# Patient Record
Sex: MALE | Race: WHITE | NOT HISPANIC OR LATINO | Employment: OTHER | URBAN - METROPOLITAN AREA
[De-identification: names, ages, dates, MRNs, and addresses within clinical notes are randomized per-mention and may not be internally consistent; named-entity substitution may affect disease eponyms.]

---

## 2017-01-25 RX ORDER — ESCITALOPRAM OXALATE 20 MG/1
TABLET ORAL
Qty: 30 TABLET | Refills: 11 | Status: SHIPPED | OUTPATIENT
Start: 2017-01-25 | End: 2018-03-20 | Stop reason: SDUPTHER

## 2017-05-12 ENCOUNTER — DOCUMENTATION ONLY (OUTPATIENT)
Dept: FAMILY MEDICINE | Facility: CLINIC | Age: 67
End: 2017-05-12

## 2017-05-12 NOTE — PROGRESS NOTES
Pre-Visit Chart Review  For Appointment Scheduled on 05/17/2017    Health Maintenance Due   Topic Date Due    Abdominal Aortic Aneurysm Screening  12/06/2013    Lipid Panel  02/09/2014

## 2017-05-17 ENCOUNTER — OFFICE VISIT (OUTPATIENT)
Dept: FAMILY MEDICINE | Facility: CLINIC | Age: 67
End: 2017-05-17
Payer: MEDICARE

## 2017-05-17 ENCOUNTER — LAB VISIT (OUTPATIENT)
Dept: LAB | Facility: HOSPITAL | Age: 67
End: 2017-05-17
Attending: FAMILY MEDICINE
Payer: MEDICARE

## 2017-05-17 VITALS
HEART RATE: 62 BPM | HEIGHT: 71 IN | WEIGHT: 169.75 LBS | TEMPERATURE: 98 F | SYSTOLIC BLOOD PRESSURE: 101 MMHG | BODY MASS INDEX: 23.77 KG/M2 | DIASTOLIC BLOOD PRESSURE: 62 MMHG

## 2017-05-17 DIAGNOSIS — I48.0 PAROXYSMAL ATRIAL FIBRILLATION: ICD-10-CM

## 2017-05-17 DIAGNOSIS — I48.0 PAROXYSMAL ATRIAL FIBRILLATION: Primary | ICD-10-CM

## 2017-05-17 DIAGNOSIS — M70.22 OLECRANON BURSITIS OF LEFT ELBOW: ICD-10-CM

## 2017-05-17 LAB
ANION GAP SERPL CALC-SCNC: 11 MMOL/L
BASOPHILS # BLD AUTO: 0.05 K/UL
BASOPHILS NFR BLD: 0.9 %
BUN SERPL-MCNC: 14 MG/DL
CALCIUM SERPL-MCNC: 9.1 MG/DL
CHLORIDE SERPL-SCNC: 104 MMOL/L
CO2 SERPL-SCNC: 28 MMOL/L
CREAT SERPL-MCNC: 0.9 MG/DL
DIFFERENTIAL METHOD: ABNORMAL
EOSINOPHIL # BLD AUTO: 0.1 K/UL
EOSINOPHIL NFR BLD: 1.7 %
ERYTHROCYTE [DISTWIDTH] IN BLOOD BY AUTOMATED COUNT: 13.8 %
EST. GFR  (AFRICAN AMERICAN): >60 ML/MIN/1.73 M^2
EST. GFR  (NON AFRICAN AMERICAN): >60 ML/MIN/1.73 M^2
GLUCOSE SERPL-MCNC: 69 MG/DL
HCT VFR BLD AUTO: 41.8 %
HGB BLD-MCNC: 13.6 G/DL
LYMPHOCYTES # BLD AUTO: 1.8 K/UL
LYMPHOCYTES NFR BLD: 33.1 %
MAGNESIUM SERPL-MCNC: 2 MG/DL
MCH RBC QN AUTO: 32 PG
MCHC RBC AUTO-ENTMCNC: 32.5 %
MCV RBC AUTO: 98 FL
MONOCYTES # BLD AUTO: 0.4 K/UL
MONOCYTES NFR BLD: 7.5 %
NEUTROPHILS # BLD AUTO: 3 K/UL
NEUTROPHILS NFR BLD: 56.8 %
PLATELET # BLD AUTO: 182 K/UL
PMV BLD AUTO: 9.6 FL
POTASSIUM SERPL-SCNC: 4.5 MMOL/L
RBC # BLD AUTO: 4.25 M/UL
SODIUM SERPL-SCNC: 143 MMOL/L
WBC # BLD AUTO: 5.35 K/UL

## 2017-05-17 PROCEDURE — 85025 COMPLETE CBC W/AUTO DIFF WBC: CPT

## 2017-05-17 PROCEDURE — 83735 ASSAY OF MAGNESIUM: CPT

## 2017-05-17 PROCEDURE — 80048 BASIC METABOLIC PNL TOTAL CA: CPT

## 2017-05-17 PROCEDURE — 99214 OFFICE O/P EST MOD 30 MIN: CPT | Mod: S$PBB,,, | Performed by: FAMILY MEDICINE

## 2017-05-17 PROCEDURE — 36415 COLL VENOUS BLD VENIPUNCTURE: CPT | Mod: PO

## 2017-05-17 PROCEDURE — 99999 PR PBB SHADOW E&M-EST. PATIENT-LVL III: CPT | Mod: PBBFAC,,, | Performed by: FAMILY MEDICINE

## 2017-05-17 RX ORDER — CLONAZEPAM 0.5 MG/1
TABLET ORAL
Refills: 1 | COMMUNITY
Start: 2017-04-18 | End: 2018-07-31

## 2017-05-17 NOTE — PROGRESS NOTES
Subjective:       Patient ID: Nicho Duque is a 66 y.o. male.    Chief Complaint: Annual Exam    HPI Comments: SUBJECTIVE:   Nicho Duque is a 66 y.o. male presenting for his annual checkup.  Current Outpatient Prescriptions:  ANTIOX#12/OM3/DHA/EPA/LUT/ZEAN (MACULAR BENEFITS ORAL), Take by mouth., Disp: , Rfl:   aspirin (ECOTRIN) 81 MG EC tablet, Take 81 mg by mouth once daily., Disp: , Rfl:   atorvastatin (LIPITOR) 40 MG tablet, Take 40 mg by mouth once daily. , Disp: , Rfl: 3  BIOTIN ORAL, Take 5,000 mcg by mouth., Disp: , Rfl:   clonazePAM (KLONOPIN) 0.5 MG tablet, TK 1 T PO BID TITRATE UP TO 2 TS TID UTD, Disp: , Rfl: 1  escitalopram oxalate (LEXAPRO) 20 MG tablet, TAKE 1 TABLET(20 MG) BY MOUTH EVERY DAY, Disp: 30 tablet, Rfl: 11  flecainide (TAMBOCOR) 100 MG Tab, Take 200 mg by mouth every 12 (twelve) hours. , Disp: , Rfl: 3  glucosamine-chondroitin 500-400 mg tablet, Take 1 tablet by mouth 3 (three) times daily., Disp: , Rfl:   multivitamin capsule, Take 1 capsule by mouth once daily. Centrum silver, Disp: , Rfl:   psyllium (METAMUCIL) powder, Take 1 packet by mouth 3 (three) times daily., Disp: , Rfl:   thiamine (VITAMIN B-1) 50 MG tablet, Take 50 mg by mouth once daily., Disp: , Rfl:   VIT A/VIT C/VIT E/ZINC/COPPER (ICAPS AREDS ORAL), Take by mouth., Disp: , Rfl:   rivaroxaban (XARELTO) 10 mg Tab, Take 1 tablet (10 mg total) by mouth 2 (two) times daily with meals. 1 tab bid for ten days then 20 mg daily, Disp: 10 tablet, Rfl: 0  rivaroxaban (XARELTO) 20 mg Tab, Take 1 tablet (20 mg total) by mouth daily with dinner or evening meal., Disp: 30 tablet, Rfl: 4  Patient Active Problem List:     Persistent atrial fibrillation     Hyperlipidemia     Degenerative disc disease     Lumbago     DDD (degenerative disc disease), lumbar     Tremors of nervous system     Depression     Hypotension due to drugs     Iron deficiency anemia due to chronic blood loss     Anemia     Olecranon bursitis of left  "elbow      No current facility-administered medications for this visit.     Allergies: Penicillins     ROS:  Feeling well. No dyspnea or chest pain on exertion. No abdominal pain, change in bowel habits, black or bloody stools. No urinary tract or prostatic symptoms. No neurological complaints.    OBJECTIVE:   The patient appears well, alert, oriented x 3, in no distress.   /62 (BP Location: Right arm, Patient Position: Sitting, BP Method: Automatic)  Pulse 62  Temp 98.3 °F (36.8 °C) (Oral)   Ht 5' 11" (1.803 m)  Wt 77 kg (169 lb 12.1 oz)  BMI 23.68 kg/m2  ENT normal.  Neck supple. No adenopathy or thyromegaly. JONAS. Lungs are clear, good air entry, no wheezes, rhonchi or rales. S1 and S2 normal, no murmurs, regular rate and rhythm. Abdomen is soft without tenderness, guarding, mass or organomegaly.  exam: deferred. Neurological is normal without focal findings.  Musculoskeletal exam: no joint tenderness, deformity or swelling, abnormal exam of left elbow.    ASSESSMENT:   healthy adult male   Paroxysmal atrial fibrillation  rivaroxaban (XARELTO) 20 mg Tab; Take 1 tablet (20 mg total) by mouth daily with dinner or evening meal.  Dispense: 30 tablet; Refill: 4  rivaroxaban (XARELTO) 10 mg Tab; Take 1 tablet (10 mg total) by mouth 2 (two) times daily with meals. 1 tab bid for ten days then 20 mg daily  Dispense: 10 tablet; Refill: 0  Basic metabolic panel; Future  CBC auto differential; Future  Magnesium; Future     Olecranon bursitis of left elbow      PLAN: .p  begin progressive daily aerobic exercise program, attempt to lose weight, decrease or avoid alcohol intake and reduce salt in diet and cooking      Review of Systems    Objective:      Physical Exam    Assessment:       1. Paroxysmal atrial fibrillation    2. Olecranon bursitis of left elbow        Plan:       Paroxysmal atrial fibrillation  -     rivaroxaban (XARELTO) 20 mg Tab; Take 1 tablet (20 mg total) by mouth daily with dinner or " evening meal.  Dispense: 30 tablet; Refill: 4  -     rivaroxaban (XARELTO) 10 mg Tab; Take 1 tablet (10 mg total) by mouth 2 (two) times daily with meals. 1 tab bid for ten days then 20 mg daily  Dispense: 10 tablet; Refill: 0  -     Basic metabolic panel; Future; Expected date: 5/17/17  -     CBC auto differential; Future; Expected date: 5/17/17    Olecranon bursitis of left elbow      ACE bandages. Letter sent to Dr Pepe, Dr Sandoval and Dr Chad Montero.

## 2017-05-17 NOTE — PATIENT INSTRUCTIONS
Living with Atrial Fibrillation: Preventing Stroke  Atrial fibrillation (AFib) is the most common abnormal heart rhythm in the world. The heart has 2 upper chambers called atria and 2 lower chambers called ventricles. AFib causes the atria to quiver (fibrillate) instead of pumping normally. Blood can then pool in the heart instead of moving in and out as usual. This can cause blood clots to form inside the heart. A clot can break free, travel to the brain and cause a stroke. A stroke can cause brain damage very quickly.    Taking medicine to prevent stroke  Your healthcare provider may prescribe a medicine to help prevent blood clots. This type of medicine is called a blood thinner. Blood thinners include:  · Antiplatelet medicines, such as aspirin or clopidrogrel  · Anticoagulation medicines, such as warfarin, dabigatran, rivaroxaban, apixaban, or edoxaban  Risks of blood thinner medicine  Blood thinners increase your risk of bleeding. If you take certain blood thinners, you may need to take extra steps to stay healthy. You may need regular blood tests to check the levels of medicine in your blood. Youll need to be careful not to injure yourself. And you may need to watch your diet for foods that affect blood clotting.  If your blood is too thin, you may have symptoms of excess bleeding, such as:  · Unusual bruising  · Bleeding from the gums  · Blood in the urine or stool  · Black stools  · Vomiting blood  · Nosebleeds  · An unusual or severe headache  Taking the right dose  Youll need to make sure to take the medicine exactly as directed by your healthcare provider. Take it at the same time each day. If you miss a dose, call your provider right away to find out how much to take. Never take a double dose. If you take too much, it can cause too much bleeding. It can cause bleeding you can see, on the outside of your body. And it can cause bleeding on the inside of your body that you may not be aware of.  Getting  your blood tested  Depending on which blood thinner you take, you may need to have your blood tested on a regular schedule. This is to make sure you dont have too much or too little of the medicine in your blood. Too much can cause excess bleeding. Too little may not prevent blood clots from harming you.  You may need to visit a hospital or clinic every week to have your blood tested. Or a nurse may come to your home and test your blood. In some cases, you may be able to test your blood at home with a small machine. Talk with your healthcare provider to find out whats best for you. After the blood test, your healthcare provider may tell you to change your dose of medicine.  Watching your diet  Some foods can affect how certain blood thinners work. In particular, warfarin levels are sensitive to your diet. For example, many foods contain vitamin K. Vitamin K is a substance that helps your blood clot. You dont need to avoid foods that have vitamin K. But you do need to keep the amount of them you eat steady as possible from day to day. Examples of foods high in vitamin K are asparagus, avocado, broccoli, cabbage, kale, spinach, and some other leafy green vegetables. Oils, such as soybean, canola, and olive, are also high in vitamin K.  Other foods and drinks can affect the way blood thinners work in your body. These include:  · Grapefruit and grapefruit juice  · Cranberries and cranberry juice  · Fish oil supplements  · Garlic, fide, licorice, and turmeric  · Herbs used in herbal teas or supplements  · Alcohol  If any of these items are part of your regular diet, continue using them as you normally would. Dont make any major changes in your diet without first talking with your healthcare provider.  You may also need to limit fats in your diet to 2 to 4 tablespoons a day.  Preventing injury  Because blood thinners make you bleed more, youll need to protect yourself from breaks in the skin. Follow these  guidelines:  · Don't go barefoot - always wear shoes.  · Don't trim corns or calluses yourself.  · Consider using an electric razor instead of a manual one.  · Use a soft-bristled toothbrush and waxed dental floss.  Youll also need to avoid any activities that may cause injury. If you fall or are injured, you could be bleeding inside your body and not know it. Make sure to get medical attention right away if you fall, hit your head, or have any other kind of injury.  Other safety tips  While on your medicine, be sure to:  · Tell all of your healthcare providers that you take a blood thinner for AFib. This includes all of your doctors, dental care providers, and your pharmacist.  · Ask your doctor before taking any new medicines, vitamins, or other supplements. Any of these can cause problems when you take a blood thinner.  · Wear a medical alert bracelet or carry an ID card in your wallet if you will be taking blood thinners for months or longer.  · Keep all appointments for your blood tests.  Procedures to prevent stroke  Most blood clots that form in the heart occur in a pouch of the left atrium called the appendage. This pouch can often be large and have multiple lobes which can permit blood pooling and clot formation. Left atrial appendage closure is a nonsurgical procedure in which a self-expanding plug is placed at the opening of the left atrial appendage to close off the appendage from the rest of the heart. Once the plug has fully sealed, no blood can enter or leave the appendage. This reduces blood clot formation and stroke risk. Ask your doctor if you qualify for this type of procedure.  Other ways to help prevent stroke  Your healthcare provider might give you other advice about how to lower your risk for stroke, such as:  · Lowering your cholesterol with lifestyle changes or medicine  · Not smoking  · Getting physical activity  · Losing weight if needed  · Eating a heart-healthy diet  · Not drinking too  much alcohol     When to call your healthcare provider  Call your healthcare provider right away if you have any of these:  · Unusual or severe headache  · Confusion, weakness, or numbness  · Loss of vision  · Difficulty with speech  · Bleeding that wont stop  · Coughing or vomiting blood  · Bright red blood in the stool  · Fall or injury to the head  · Symptoms of atrial fibrillation that are new or getting worse   Date Last Reviewed: 5/1/2016  © 4378-1537 [employer]. [address] [place] [address], [place] [address]. All rights reserved. This information is not intended as a substitute for professional medical care. Always follow your healthcare professional's instructions.        Bursitis of the Elbow (Olecranon)  Your elbow joint contains a small fluid-filled sac called a bursa. The bursa helps the muscles and tendons move smoothly over the bone. It also cushions and protects your elbow. Bursitis is when the bursa is inflamed or swollen. This is most often due to overuse of or injury to the elbow. Symptoms include swelling and pain. If the elbow is red and feels warm to the touch, the bursa itself may be infected.  In most cases, elbow bursitis resolves with medicine and self-care at home. It may take several weeks for the bursa to heal and the swelling to go away. In some cases, your healthcare provider may drain excess fluid from the bursa. Or, he or she may inject medicine directly into the bursa to help relieve symptoms. In severe cases, you may need surgery to remove the bursa may. If there is concern that the bursa is infected, your healthcare provider may prescribe antibiotics to treat the infection.    Home care  Your healthcare provider may prescribe medicine to help relieve pain and swelling. This may be an over-the-counter pain reliever or prescription pain medicine. Take all medicines as directed. To help treat or prevent infection, your provider may prescribe antibiotics. If these are  prescribed, take them as directed until they are gone.  The following are general care guidelines:  · Apply an ice pack or bag of frozen peas wrapped in a thin towel to your elbow for 15 to 20 minutes at a time. Do this 3 to 4 times a day until pain and swelling improve.  · Keep your elbow raised above the level of your heart whenever possible. This helps reduce swelling. When sitting or lying down, place your arm on a pillow that rests on your chest or on a pillow at your side.  · Use an elastic wrap around the elbow joint to compress the area while it is healing. Make the wrap snug but not tight to the point of causing pain.  · Rest your elbow to give it time to heal. You may need to wear an elbow pad to help protect and limit the movement of your elbow. During and after healing, avoid leaning on your elbows.  Follow-up care  Follow up with your healthcare provider, or as advised. If you have been referred to a specialist, make that appointment promptly.  When to seek medical advice  Call your healthcare provider right away if any of these occur:  · Fever of 100.4°F (38°C) or higher, or as advised  · Chills  · Increased pain, swelling, warmth, redness, or drainage from the joint  · Trouble moving the elbow joint  · Numbness or tingling in the hand  · Severe pain or swelling in forearm or hand  · Loss of pink color and slow return of color after squeezing fingertip or hand  Date Last Reviewed: 6/1/2016  © 4875-2186 [employer]. [address] [place] [address], [place] [address]. All rights reserved. This information is not intended as a substitute for professional medical care. Always follow your healthcare professional's instructions.
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
## 2017-05-17 NOTE — MR AVS SNAPSHOT
[facility]  [address]  [place] [address]  Phone: [phone]  Fax: [phone]                  Nicho Duque   2017 1:00 PM   Office Visit    Description:  Male : 1950   Provider:  Grady Potts MD   Department:  [facility] Medicine           Reason for Visit     Annual Exam           Diagnoses this Visit        Comments    Paroxysmal atrial fibrillation    -  Primary     Olecranon bursitis of left elbow                To Do List           Future Appointments        Provider Department Dept Phone    2017 1:20 PM Grady Potts MD [facility] [phone]      Goals (5 Years of Data)     None      Follow-Up and Disposition     Return in about 6 months (around 2017).       These Medications        Disp Refills Start End    rivaroxaban (XARELTO) 20 mg Tab 30 tablet 4 2017    Take 1 tablet (20 mg total) by mouth daily with dinner or evening meal. - Oral    Pharmacy: [facility] [id number] [facility] [address] AT [facility] Ph #: [phone]       rivaroxaban (XARELTO) 10 mg Tab 10 tablet 0 2017    Take 1 tablet (10 mg total) by mouth 2 (two) times daily with meals. 1 tab bid for ten days then 20 mg daily - Oral    Pharmacy: [facility] [id number] [facility], [place] - [address] [facility] Ph #: [phone]         [facility]     [facility] -  Assistance  Unless otherwise directed by your provider, please contact [facility], our nurse care line that is available for  assistance.     Registered nurses in the [facility] provide: appointment scheduling, clinical advisement, health education, and other advisory services.  Call: [phone] (toll free)               Medications           Message regarding Medications     Verify the changes and/or additions to your medication regime listed  below are the same as discussed with your clinician today.  If any of these changes or additions are incorrect, please notify your healthcare provider.        START taking these NEW medications        Refills    rivaroxaban (XARELTO) 20 mg Tab 4    Sig: Take 1 tablet (20 mg total) by mouth daily with dinner or evening meal.    Class: Normal    Route: Oral    rivaroxaban (XARELTO) 10 mg Tab 0    Sig: Take 1 tablet (10 mg total) by mouth 2 (two) times daily with meals. 1 tab bid for ten days then 20 mg daily    Class: Normal    Route: Oral      STOP taking these medications     amantadine HCl (SYMMETREL) 100 mg capsule Take 1 capsule (100 mg total) by mouth 2 (two) times daily.    ferrous sulfate 325 mg (65 mg iron) Tab tablet Take 1 tablet (325 mg total) by mouth daily with breakfast.    temazepam (RESTORIL) 22.5 MG capsule Take 22.5 mg by mouth nightly as needed for Insomnia.    diazePAM (VALIUM) 10 MG Tab Take 10 mg by mouth. 2 tablets daily           Verify that the below list of medications is an accurate representation of the medications you are currently taking.  If none reported, the list may be blank. If incorrect, please contact your healthcare provider. Carry this list with you in case of emergency.           Current Medications     ANTIOX#12/OM3/DHA/EPA/LUT/ZEAN (MACULAR BENEFITS ORAL) Take by mouth.    aspirin (ECOTRIN) 81 MG EC tablet Take 81 mg by mouth once daily.    atorvastatin (LIPITOR) 40 MG tablet Take 40 mg by mouth once daily.     BIOTIN ORAL Take 5,000 mcg by mouth.    clonazePAM (KLONOPIN) 0.5 MG tablet TK 1 T PO BID TITRATE UP TO 2 TS TID UTD    escitalopram oxalate (LEXAPRO) 20 MG tablet TAKE 1 TABLET(20 MG) BY MOUTH EVERY DAY    flecainide (TAMBOCOR) 100 MG Tab Take 200 mg by mouth every 12 (twelve) hours.     glucosamine-chondroitin 500-400 mg tablet Take 1 tablet by mouth 3 (three) times daily.    multivitamin capsule Take 1 capsule by mouth once daily. Centrum silver    psyllium  "(METAMUCIL) powder Take 1 packet by mouth 3 (three) times daily.    thiamine (VITAMIN B-1) 50 MG tablet Take 50 mg by mouth once daily.    VIT A/VIT C/VIT E/ZINC/COPPER (ICAPS AREDS ORAL) Take by mouth.    rivaroxaban (XARELTO) 10 mg Tab Take 1 tablet (10 mg total) by mouth 2 (two) times daily with meals. 1 tab bid for ten days then 20 mg daily    rivaroxaban (XARELTO) 20 mg Tab Take 1 tablet (20 mg total) by mouth daily with dinner or evening meal.           Clinical Reference Information           Your Vitals Were     BP Pulse Temp Height Weight BMI    101/62 (BP Location: Right arm, Patient Position: Sitting, BP Method: Automatic) 62 98.3 °F (36.8 °C) (Oral) 5' 11" (1.803 m) 77 kg (169 lb 12.1 oz) 23.68 kg/m2      Blood Pressure          Most Recent Value    BP  101/62      Allergies as of 5/17/2017     Penicillins      Immunizations Administered on Date of Encounter - 5/17/2017     None      Orders Placed During Today's Visit     Future Labs/Procedures Expected by Expires    Basic metabolic panel  5/17/2017 7/16/2018    CBC auto differential  5/17/2017 7/16/2018    Magnesium  5/17/2017 7/16/2018      [facility] Sign-Up     Activating your [facility] account is as easy as 1-2-3!     1) Visit [email or web address], select Sign Up Now, enter this activation code and your date of birth, then select Next.  Activation code not generated  Current Patient Portal Status: Account disabled      2) Create a username and password to use when you visit [facility] in the future and select a security question in case you lose your password and select Next.    3) Enter your e-mail address and click Sign Up!    Additional Information  If you have questions, please e-mail [email or web address] or call [phone] to talk to our [facility] staff. Remember, [facility] is NOT to be used for urgent needs. For medical emergencies, dial 911.         Instructions      Living with Atrial Fibrillation: Preventing Stroke  Atrial fibrillation (AFib) is " the most common abnormal heart rhythm in the world. The heart has 2 upper chambers called atria and 2 lower chambers called ventricles. AFib causes the atria to quiver (fibrillate) instead of pumping normally. Blood can then pool in the heart instead of moving in and out as usual. This can cause blood clots to form inside the heart. A clot can break free, travel to the brain and cause a stroke. A stroke can cause brain damage very quickly.    Taking medicine to prevent stroke  Your healthcare provider may prescribe a medicine to help prevent blood clots. This type of medicine is called a blood thinner. Blood thinners include:  · Antiplatelet medicines, such as aspirin or clopidrogrel  · Anticoagulation medicines, such as warfarin, dabigatran, rivaroxaban, apixaban, or edoxaban  Risks of blood thinner medicine  Blood thinners increase your risk of bleeding. If you take certain blood thinners, you may need to take extra steps to stay healthy. You may need regular blood tests to check the levels of medicine in your blood. Youll need to be careful not to injure yourself. And you may need to watch your diet for foods that affect blood clotting.  If your blood is too thin, you may have symptoms of excess bleeding, such as:  · Unusual bruising  · Bleeding from the gums  · Blood in the urine or stool  · Black stools  · Vomiting blood  · Nosebleeds  · An unusual or severe headache  Taking the right dose  Youll need to make sure to take the medicine exactly as directed by your healthcare provider. Take it at the same time each day. If you miss a dose, call your provider right away to find out how much to take. Never take a double dose. If you take too much, it can cause too much bleeding. It can cause bleeding you can see, on the outside of your body. And it can cause bleeding on the inside of your body that you may not be aware of.  Getting your blood tested  Depending on which blood thinner you take, you may need to have  your blood tested on a regular schedule. This is to make sure you dont have too much or too little of the medicine in your blood. Too much can cause excess bleeding. Too little may not prevent blood clots from harming you.  You may need to visit a hospital or clinic every week to have your blood tested. Or a nurse may come to your home and test your blood. In some cases, you may be able to test your blood at home with a small machine. Talk with your healthcare provider to find out whats best for you. After the blood test, your healthcare provider may tell you to change your dose of medicine.  Watching your diet  Some foods can affect how certain blood thinners work. In particular, warfarin levels are sensitive to your diet. For example, many foods contain vitamin K. Vitamin K is a substance that helps your blood clot. You dont need to avoid foods that have vitamin K. But you do need to keep the amount of them you eat steady as possible from day to day. Examples of foods high in vitamin K are asparagus, avocado, broccoli, cabbage, kale, spinach, and some other leafy green vegetables. Oils, such as soybean, canola, and olive, are also high in vitamin K.  Other foods and drinks can affect the way blood thinners work in your body. These include:  · Grapefruit and grapefruit juice  · Cranberries and cranberry juice  · Fish oil supplements  · Garlic, fide, licorice, and turmeric  · Herbs used in herbal teas or supplements  · Alcohol  If any of these items are part of your regular diet, continue using them as you normally would. Dont make any major changes in your diet without first talking with your healthcare provider.  You may also need to limit fats in your diet to 2 to 4 tablespoons a day.  Preventing injury  Because blood thinners make you bleed more, youll need to protect yourself from breaks in the skin. Follow these guidelines:  · Don't go barefoot - always wear shoes.  · Don't trim corns or calluses  yourself.  · Consider using an electric razor instead of a manual one.  · Use a soft-bristled toothbrush and waxed dental floss.  Youll also need to avoid any activities that may cause injury. If you fall or are injured, you could be bleeding inside your body and not know it. Make sure to get medical attention right away if you fall, hit your head, or have any other kind of injury.  Other safety tips  While on your medicine, be sure to:  · Tell all of your healthcare providers that you take a blood thinner for AFib. This includes all of your doctors, dental care providers, and your pharmacist.  · Ask your doctor before taking any new medicines, vitamins, or other supplements. Any of these can cause problems when you take a blood thinner.  · Wear a medical alert bracelet or carry an ID card in your wallet if you will be taking blood thinners for months or longer.  · Keep all appointments for your blood tests.  Procedures to prevent stroke  Most blood clots that form in the heart occur in a pouch of the left atrium called the appendage. This pouch can often be large and have multiple lobes which can permit blood pooling and clot formation. Left atrial appendage closure is a nonsurgical procedure in which a self-expanding plug is placed at the opening of the left atrial appendage to close off the appendage from the rest of the heart. Once the plug has fully sealed, no blood can enter or leave the appendage. This reduces blood clot formation and stroke risk. Ask your doctor if you qualify for this type of procedure.  Other ways to help prevent stroke  Your healthcare provider might give you other advice about how to lower your risk for stroke, such as:  · Lowering your cholesterol with lifestyle changes or medicine  · Not smoking  · Getting physical activity  · Losing weight if needed  · Eating a heart-healthy diet  · Not drinking too much alcohol     When to call your healthcare provider  Call your healthcare provider  right away if you have any of these:  · Unusual or severe headache  · Confusion, weakness, or numbness  · Loss of vision  · Difficulty with speech  · Bleeding that wont stop  · Coughing or vomiting blood  · Bright red blood in the stool  · Fall or injury to the head  · Symptoms of atrial fibrillation that are new or getting worse   Date Last Reviewed: 5/1/2016  © 1945-5007 [employer]. [address] [place] [address], [place] [address]. All rights reserved. This information is not intended as a substitute for professional medical care. Always follow your healthcare professional's instructions.        Bursitis of the Elbow (Olecranon)  Your elbow joint contains a small fluid-filled sac called a bursa. The bursa helps the muscles and tendons move smoothly over the bone. It also cushions and protects your elbow. Bursitis is when the bursa is inflamed or swollen. This is most often due to overuse of or injury to the elbow. Symptoms include swelling and pain. If the elbow is red and feels warm to the touch, the bursa itself may be infected.  In most cases, elbow bursitis resolves with medicine and self-care at home. It may take several weeks for the bursa to heal and the swelling to go away. In some cases, your healthcare provider may drain excess fluid from the bursa. Or, he or she may inject medicine directly into the bursa to help relieve symptoms. In severe cases, you may need surgery to remove the bursa may. If there is concern that the bursa is infected, your healthcare provider may prescribe antibiotics to treat the infection.    Home care  Your healthcare provider may prescribe medicine to help relieve pain and swelling. This may be an over-the-counter pain reliever or prescription pain medicine. Take all medicines as directed. To help treat or prevent infection, your provider may prescribe antibiotics. If these are prescribed, take them as directed until they are gone.  The following are general care  guidelines:  · Apply an ice pack or bag of frozen peas wrapped in a thin towel to your elbow for 15 to 20 minutes at a time. Do this 3 to 4 times a day until pain and swelling improve.  · Keep your elbow raised above the level of your heart whenever possible. This helps reduce swelling. When sitting or lying down, place your arm on a pillow that rests on your chest or on a pillow at your side.  · Use an elastic wrap around the elbow joint to compress the area while it is healing. Make the wrap snug but not tight to the point of causing pain.  · Rest your elbow to give it time to heal. You may need to wear an elbow pad to help protect and limit the movement of your elbow. During and after healing, avoid leaning on your elbows.  Follow-up care  Follow up with your healthcare provider, or as advised. If you have been referred to a specialist, make that appointment promptly.  When to seek medical advice  Call your healthcare provider right away if any of these occur:  · Fever of 100.4°F (38°C) or higher, or as advised  · Chills  · Increased pain, swelling, warmth, redness, or drainage from the joint  · Trouble moving the elbow joint  · Numbness or tingling in the hand  · Severe pain or swelling in forearm or hand  · Loss of pink color and slow return of color after squeezing fingertip or hand  Date Last Reviewed: 6/1/2016  © 7827-0322 [employer]. [address] [place] [address]. All rights reserved. This information is not intended as a substitute for professional medical care. Always follow your healthcare professional's instructions.             Language Assistance Services     ATTENTION: Language assistance services are available, free of charge. Please call [phone].      ATENCIÓN: Si vipinla emperatriz, tiene a zhu disposición servicios gratuitos de asistencia lingüística. Llame al [phone].     CHÚ Ý: N?u b?n nói Ti?ng Vi?t, có các d?ch v? h? tr? ngôn ng? mi?n phí dành cho b?n. G?i  s? [phone].         [facility] complies with applicable Federal civil rights laws and does not discriminate on the basis of race, color, national origin, age, disability, or sex.

## 2017-05-24 ENCOUNTER — TELEPHONE (OUTPATIENT)
Dept: FAMILY MEDICINE | Facility: CLINIC | Age: 67
End: 2017-05-24

## 2017-05-24 NOTE — TELEPHONE ENCOUNTER
----- Message from Nataliia Ko sent at 5/24/2017 11:03 AM CDT -----  Contact: Isi Vaca  Needs clarification on days. The quantity is only 5 instead of 10 per the instructions.     [facility] [id number] - [facility] [address] [facility]  [address]  [facility] [address]  Phone: [phone] Fax: [phone]

## 2017-06-14 ENCOUNTER — TELEPHONE (OUTPATIENT)
Dept: FAMILY MEDICINE | Facility: CLINIC | Age: 67
End: 2017-06-14

## 2017-06-14 ENCOUNTER — OFFICE VISIT (OUTPATIENT)
Dept: FAMILY MEDICINE | Facility: CLINIC | Age: 67
End: 2017-06-14
Payer: MEDICARE

## 2017-06-14 ENCOUNTER — HOSPITAL ENCOUNTER (OUTPATIENT)
Dept: RADIOLOGY | Facility: HOSPITAL | Age: 67
Discharge: HOME | End: 2017-06-14
Attending: FAMILY MEDICINE
Payer: MEDICARE

## 2017-06-14 ENCOUNTER — DOCUMENTATION ONLY (OUTPATIENT)
Dept: FAMILY MEDICINE | Facility: CLINIC | Age: 67
End: 2017-06-14

## 2017-06-14 VITALS
TEMPERATURE: 98 F | WEIGHT: 170.63 LBS | DIASTOLIC BLOOD PRESSURE: 69 MMHG | SYSTOLIC BLOOD PRESSURE: 115 MMHG | HEIGHT: 71 IN | HEART RATE: 60 BPM | BODY MASS INDEX: 23.89 KG/M2

## 2017-06-14 DIAGNOSIS — R42 DIZZINESS: ICD-10-CM

## 2017-06-14 DIAGNOSIS — S09.90XA HEAD INJURY, ACUTE, INITIAL ENCOUNTER: Primary | ICD-10-CM

## 2017-06-14 DIAGNOSIS — F07.81 POST-CONCUSSION SYNDROME: Primary | ICD-10-CM

## 2017-06-14 DIAGNOSIS — S09.90XA HEAD INJURY, ACUTE, INITIAL ENCOUNTER: ICD-10-CM

## 2017-06-14 DIAGNOSIS — R53.83 FATIGUE, UNSPECIFIED TYPE: ICD-10-CM

## 2017-06-14 DIAGNOSIS — Z79.01 ANTICOAGULATED: ICD-10-CM

## 2017-06-14 PROCEDURE — 1159F MED LIST DOCD IN RCRD: CPT | Mod: ,,, | Performed by: PHYSICIAN ASSISTANT

## 2017-06-14 PROCEDURE — 99214 OFFICE O/P EST MOD 30 MIN: CPT | Mod: S$PBB,,, | Performed by: PHYSICIAN ASSISTANT

## 2017-06-14 PROCEDURE — 99213 OFFICE O/P EST LOW 20 MIN: CPT | Mod: PBBFAC,25,PO | Performed by: PHYSICIAN ASSISTANT

## 2017-06-14 PROCEDURE — 70450 CT HEAD/BRAIN W/O DYE: CPT | Mod: TC

## 2017-06-14 PROCEDURE — 1126F AMNT PAIN NOTED NONE PRSNT: CPT | Mod: ,,, | Performed by: PHYSICIAN ASSISTANT

## 2017-06-14 PROCEDURE — 99999 PR PBB SHADOW E&M-EST. PATIENT-LVL III: CPT | Mod: PBBFAC,,, | Performed by: PHYSICIAN ASSISTANT

## 2017-06-14 PROCEDURE — 70450 CT HEAD/BRAIN W/O DYE: CPT | Mod: 26,,, | Performed by: RADIOLOGY

## 2017-06-14 RX ORDER — DONEPEZIL HYDROCHLORIDE 10 MG/1
TABLET, FILM COATED ORAL
Refills: 3 | COMMUNITY
Start: 2017-05-23 | End: 2017-08-28

## 2017-06-14 NOTE — TELEPHONE ENCOUNTER
----- Message from Khang DURBIN Frisard sent at 6/14/2017 10:30 AM CDT -----  Contact: same  Patient called in and stated he slipped and fell out of the tub this past Monday 6/12/17 and had to get 6 stitches in his head.  Patient was out of town on vacation when this happened.  Patient stated he has been feeling lightheaded and feels like he needs to sleep all the time.    Patient did not know if that was normal or if he needs to come in.  Patient call back number is [phone]

## 2017-06-14 NOTE — TELEPHONE ENCOUNTER
Spoke to patient who states on Monday he fell in the bathtub, patient was seen in ER where he received 6 stitches to the left back side of his head. Patient is now lightheaded and drowsy all of the time. States did not received anything narcotic for pain at time of fall, was advised to use Advil. Appointment scheduled for today. Patient agreed to appointment date and time.

## 2017-06-14 NOTE — PROGRESS NOTES
Pre-Visit Chart Review  For Appointment Scheduled on 06/14/17    Health Maintenance Due   Topic Date Due    Abdominal Aortic Aneurysm Screening  12/06/2013

## 2017-06-15 ENCOUNTER — HOSPITAL ENCOUNTER (EMERGENCY)
Facility: HOSPITAL | Age: 67
Discharge: HOME | End: 2017-06-15
Attending: EMERGENCY MEDICINE
Payer: MEDICARE

## 2017-06-15 VITALS
BODY MASS INDEX: 23.77 KG/M2 | TEMPERATURE: 99 F | HEIGHT: 71 IN | HEART RATE: 56 BPM | SYSTOLIC BLOOD PRESSURE: 128 MMHG | DIASTOLIC BLOOD PRESSURE: 79 MMHG | OXYGEN SATURATION: 98 % | RESPIRATION RATE: 18 BRPM | WEIGHT: 169.75 LBS

## 2017-06-15 DIAGNOSIS — R42 DIZZINESS: Primary | ICD-10-CM

## 2017-06-15 DIAGNOSIS — R53.1 WEAKNESS: ICD-10-CM

## 2017-06-15 DIAGNOSIS — F07.81 POSTCONCUSSION SYNDROME: ICD-10-CM

## 2017-06-15 LAB
ALBUMIN SERPL BCP-MCNC: 3.7 G/DL
ALP SERPL-CCNC: 94 U/L
ALT SERPL W/O P-5'-P-CCNC: 23 U/L
ANION GAP SERPL CALC-SCNC: 11 MMOL/L
APTT BLDCRRT: 33.4 SEC
AST SERPL-CCNC: 37 U/L
BASOPHILS # BLD AUTO: 0 K/UL
BASOPHILS NFR BLD: 0.4 %
BILIRUB SERPL-MCNC: 0.9 MG/DL
BILIRUB UR QL STRIP: NEGATIVE
BUN SERPL-MCNC: 14 MG/DL
CALCIUM SERPL-MCNC: 9.1 MG/DL
CHLORIDE SERPL-SCNC: 104 MMOL/L
CLARITY UR: CLEAR
CO2 SERPL-SCNC: 27 MMOL/L
COLOR UR: YELLOW
CREAT SERPL-MCNC: 0.9 MG/DL
DIFFERENTIAL METHOD: ABNORMAL
EOSINOPHIL # BLD AUTO: 0.1 K/UL
EOSINOPHIL NFR BLD: 1.6 %
ERYTHROCYTE [DISTWIDTH] IN BLOOD BY AUTOMATED COUNT: 14.2 %
EST. GFR  (AFRICAN AMERICAN): >60 ML/MIN/1.73 M^2
EST. GFR  (NON AFRICAN AMERICAN): >60 ML/MIN/1.73 M^2
GLUCOSE SERPL-MCNC: 87 MG/DL
GLUCOSE UR QL STRIP: NEGATIVE
HCT VFR BLD AUTO: 36.5 %
HGB BLD-MCNC: 12.5 G/DL
HGB UR QL STRIP: NEGATIVE
INR PPP: 1.1
KETONES UR QL STRIP: NEGATIVE
LEUKOCYTE ESTERASE UR QL STRIP: NEGATIVE
LYMPHOCYTES # BLD AUTO: 1.6 K/UL
LYMPHOCYTES NFR BLD: 29.9 %
MAGNESIUM SERPL-MCNC: 1.9 MG/DL
MCH RBC QN AUTO: 32.2 PG
MCHC RBC AUTO-ENTMCNC: 34.2 %
MCV RBC AUTO: 94 FL
MONOCYTES # BLD AUTO: 0.3 K/UL
MONOCYTES NFR BLD: 6.3 %
NEUTROPHILS # BLD AUTO: 3.3 K/UL
NEUTROPHILS NFR BLD: 61.8 %
NITRITE UR QL STRIP: NEGATIVE
PH UR STRIP: 7 [PH] (ref 5–8)
PLATELET # BLD AUTO: 188 K/UL
PMV BLD AUTO: 6.2 FL
POTASSIUM SERPL-SCNC: 4.2 MMOL/L
PROT SERPL-MCNC: 6.5 G/DL
PROT UR QL STRIP: NEGATIVE
PROTHROMBIN TIME: 11.6 SEC
RBC # BLD AUTO: 3.88 M/UL
SODIUM SERPL-SCNC: 142 MMOL/L
SP GR UR STRIP: 1.02 (ref 1–1.03)
TROPONIN I SERPL DL<=0.01 NG/ML-MCNC: <0.006 NG/ML
TSH SERPL DL<=0.005 MIU/L-ACNC: 2.12 UIU/ML
URN SPEC COLLECT METH UR: NORMAL
UROBILINOGEN UR STRIP-ACNC: NEGATIVE EU/DL
WBC # BLD AUTO: 5.3 K/UL

## 2017-06-15 PROCEDURE — 99284 EMERGENCY DEPT VISIT MOD MDM: CPT

## 2017-06-15 PROCEDURE — 81003 URINALYSIS AUTO W/O SCOPE: CPT

## 2017-06-15 PROCEDURE — 85610 PROTHROMBIN TIME: CPT

## 2017-06-15 PROCEDURE — 83735 ASSAY OF MAGNESIUM: CPT

## 2017-06-15 PROCEDURE — 85025 COMPLETE CBC W/AUTO DIFF WBC: CPT

## 2017-06-15 PROCEDURE — 84484 ASSAY OF TROPONIN QUANT: CPT

## 2017-06-15 PROCEDURE — 93005 ELECTROCARDIOGRAM TRACING: CPT

## 2017-06-15 PROCEDURE — 85730 THROMBOPLASTIN TIME PARTIAL: CPT

## 2017-06-15 PROCEDURE — 84443 ASSAY THYROID STIM HORMONE: CPT

## 2017-06-15 PROCEDURE — 36415 COLL VENOUS BLD VENIPUNCTURE: CPT

## 2017-06-15 PROCEDURE — 80053 COMPREHEN METABOLIC PANEL: CPT

## 2017-06-15 NOTE — TELEPHONE ENCOUNTER
Documentation       Jaylene Adams LPN   You 11 minutes ago (10:33 AM)      I'm sorry we have no appointments today. 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 We will be in [place] on Monday if that helps? (Routing comment)        Notified patient and patient wife no appointments available with Dr Nixon today.   Notified patient and his wife to go to ER per Dr Miller and ERA Dodson,notified that patient should not drive himself.
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
## 2017-06-15 NOTE — TELEPHONE ENCOUNTER
Patient and patient wife called stating patient feels worst than yesterday,complaints of pain,headaches,fatigue,can not get out of bed and nightmares since falling and hitting head,patient has CT done yesterday,is it possible patient can get in today with Dr Nixon

## 2017-06-15 NOTE — TELEPHONE ENCOUNTER
Patient needs apt next week for suture removal. Please scheduled Tuesday or Wednesday of next week.

## 2017-06-19 ENCOUNTER — OFFICE VISIT (OUTPATIENT)
Dept: PHYSICAL MEDICINE AND REHAB | Facility: CLINIC | Age: 67
End: 2017-06-19
Payer: MEDICARE

## 2017-06-19 VITALS
HEART RATE: 63 BPM | SYSTOLIC BLOOD PRESSURE: 103 MMHG | HEIGHT: 71 IN | BODY MASS INDEX: 23.8 KG/M2 | WEIGHT: 170 LBS | DIASTOLIC BLOOD PRESSURE: 64 MMHG

## 2017-06-19 DIAGNOSIS — S06.0X9A CONCUSSION, WITH LOC OF UNSPECIFIED DURATION, INITIAL ENCOUNTER: Primary | ICD-10-CM

## 2017-06-19 DIAGNOSIS — S06.0X1A CONCUSSION WITH BRIEF LOC: ICD-10-CM

## 2017-06-19 PROCEDURE — 99212 OFFICE O/P EST SF 10 MIN: CPT | Mod: PBBFAC,PN | Performed by: PHYSICAL MEDICINE & REHABILITATION

## 2017-06-19 PROCEDURE — 1159F MED LIST DOCD IN RCRD: CPT | Mod: ,,, | Performed by: PHYSICAL MEDICINE & REHABILITATION

## 2017-06-19 PROCEDURE — 99215 OFFICE O/P EST HI 40 MIN: CPT | Mod: S$PBB,,, | Performed by: PHYSICAL MEDICINE & REHABILITATION

## 2017-06-19 PROCEDURE — 1125F AMNT PAIN NOTED PAIN PRSNT: CPT | Mod: ,,, | Performed by: PHYSICAL MEDICINE & REHABILITATION

## 2017-06-19 PROCEDURE — 99999 PR PBB SHADOW E&M-EST. PATIENT-LVL II: CPT | Mod: PBBFAC,,, | Performed by: PHYSICAL MEDICINE & REHABILITATION
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
## 2017-06-19 NOTE — LETTER
June 19, 2017      ERA Barrios  [address]  [facility] [address]           [facility]  [address] [place] [id number]  Phone: [phone]  Fax: [phone]          Patient: Nicho Duque   MR Number: [id number]   YOB: 1950   Date of Visit: 6/19/2017       Dear Abby Ho:    Thank you for referring Nicho Duque to me for evaluation. Attached you will find relevant portions of my assessment and plan of care.    If you have questions, please do not hesitate to call me. I look forward to following Nicho Duque along with you.    Sincerely,    Jose Nixon MD    Enclosure  CC:  No Recipients    If you would like to receive this communication electronically, please contact [email or web address] or [phone] to request more information on [employer] Link access.    For providers and/or their staff who would like to refer a patient to [facility], please contact us through our one-stop-shop provider referral line, [facility], at [phone].    If you feel you have received this communication in error or would no longer like to receive these types of communications, please e-mail [email or web address]

## 2017-07-05 ENCOUNTER — TELEPHONE (OUTPATIENT)
Dept: FAMILY MEDICINE | Facility: CLINIC | Age: 67
End: 2017-07-05

## 2017-07-05 NOTE — TELEPHONE ENCOUNTER
Did patient ever get sutures removed from laceration? I am just now getting his Urgent Care paperwork from [place].

## 2017-08-28 ENCOUNTER — HOSPITAL ENCOUNTER (OUTPATIENT)
Dept: RADIOLOGY | Facility: CLINIC | Age: 67
Discharge: HOME | End: 2017-08-28
Attending: FAMILY MEDICINE
Payer: MEDICARE

## 2017-08-28 ENCOUNTER — OFFICE VISIT (OUTPATIENT)
Dept: FAMILY MEDICINE | Facility: CLINIC | Age: 67
End: 2017-08-28
Payer: MEDICARE

## 2017-08-28 ENCOUNTER — DOCUMENTATION ONLY (OUTPATIENT)
Dept: FAMILY MEDICINE | Facility: CLINIC | Age: 67
End: 2017-08-28

## 2017-08-28 VITALS
SYSTOLIC BLOOD PRESSURE: 102 MMHG | HEART RATE: 76 BPM | HEIGHT: 71 IN | WEIGHT: 168.44 LBS | TEMPERATURE: 98 F | BODY MASS INDEX: 23.58 KG/M2 | DIASTOLIC BLOOD PRESSURE: 73 MMHG

## 2017-08-28 DIAGNOSIS — S09.90XS HEAD TRAUMA, SEQUELA: Primary | ICD-10-CM

## 2017-08-28 DIAGNOSIS — M25.511 ACUTE PAIN OF RIGHT SHOULDER: ICD-10-CM

## 2017-08-28 DIAGNOSIS — M25.551 RIGHT HIP PAIN: ICD-10-CM

## 2017-08-28 DIAGNOSIS — S06.0X0A CONCUSSION, WITHOUT LOC, INITIAL ENCOUNTER: ICD-10-CM

## 2017-08-28 PROCEDURE — 73030 X-RAY EXAM OF SHOULDER: CPT | Mod: 26,RT,S$GLB, | Performed by: RADIOLOGY

## 2017-08-28 PROCEDURE — 99213 OFFICE O/P EST LOW 20 MIN: CPT | Mod: S$PBB,,, | Performed by: FAMILY MEDICINE

## 2017-08-28 PROCEDURE — 73502 X-RAY EXAM HIP UNI 2-3 VIEWS: CPT | Mod: 26,RT,S$GLB, | Performed by: RADIOLOGY

## 2017-08-28 PROCEDURE — 99999 PR PBB SHADOW E&M-EST. PATIENT-LVL III: CPT | Mod: PBBFAC,,, | Performed by: FAMILY MEDICINE

## 2017-08-28 PROCEDURE — 73502 X-RAY EXAM HIP UNI 2-3 VIEWS: CPT | Mod: TC,PO,RT
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
## 2017-08-28 NOTE — PROGRESS NOTES
Pre-Visit Chart Review  For Appointment Scheduled on 8-28-17    Health Maintenance Due   Topic Date Due    Abdominal Aortic Aneurysm Screening  12/06/2013    Influenza Vaccine  08/01/2017    Lipid Panel  08/04/2017

## 2017-08-29 ENCOUNTER — HOSPITAL ENCOUNTER (OUTPATIENT)
Dept: RADIOLOGY | Facility: HOSPITAL | Age: 67
Discharge: HOME | End: 2017-08-29
Attending: FAMILY MEDICINE
Payer: MEDICARE

## 2017-08-29 DIAGNOSIS — S09.90XS HEAD TRAUMA, SEQUELA: ICD-10-CM

## 2017-08-29 DIAGNOSIS — S06.0X0A CONCUSSION, WITHOUT LOC, INITIAL ENCOUNTER: ICD-10-CM

## 2017-08-29 PROCEDURE — 25500020 PHARM REV CODE 255: Performed by: FAMILY MEDICINE

## 2017-08-29 PROCEDURE — 70553 MRI BRAIN STEM W/O & W/DYE: CPT | Mod: TC

## 2017-08-29 PROCEDURE — 70553 MRI BRAIN STEM W/O & W/DYE: CPT | Mod: 26,,, | Performed by: RADIOLOGY

## 2017-08-29 PROCEDURE — A9585 GADOBUTROL INJECTION: HCPCS | Performed by: FAMILY MEDICINE

## 2017-08-29 RX ORDER — GADOBUTROL 604.72 MG/ML
INJECTION INTRAVENOUS
Status: DISCONTINUED
Start: 2017-08-29 | End: 2017-08-30 | Stop reason: HOSPADM

## 2017-08-29 RX ORDER — GADOBUTROL 604.72 MG/ML
7 INJECTION INTRAVENOUS
Status: COMPLETED | OUTPATIENT
Start: 2017-08-29 | End: 2017-08-29

## 2017-08-29 RX ADMIN — GADOBUTROL 7 ML: 604.72 INJECTION INTRAVENOUS at 07:08

## 2017-08-31 ENCOUNTER — TELEPHONE (OUTPATIENT)
Dept: FAMILY MEDICINE | Facility: CLINIC | Age: 67
End: 2017-08-31

## 2017-08-31 NOTE — TELEPHONE ENCOUNTER
Spoke with patient about MRI results of mild brain atrophy due to age changes ; otherwise negative MRI of the brain.
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
## 2017-08-31 NOTE — TELEPHONE ENCOUNTER
No restrictions against flying. If headaches, confusion, dizziness then he needs to come back in for reevaluation

## 2017-08-31 NOTE — TELEPHONE ENCOUNTER
----- Message from Nicolás Resendiz sent at 8/31/2017 12:59 PM CDT -----  Contact: patient  Patient called requesting lab results.please call back at [phone] to discuss. Thanks,

## 2017-09-01 ENCOUNTER — TELEPHONE (OUTPATIENT)
Dept: FAMILY MEDICINE | Facility: CLINIC | Age: 67
End: 2017-09-01

## 2017-09-02 ENCOUNTER — NURSE TRIAGE (OUTPATIENT)
Dept: ADMINISTRATIVE | Facility: CLINIC | Age: 67
End: 2017-09-02

## 2017-09-02 ENCOUNTER — HOSPITAL ENCOUNTER (EMERGENCY)
Facility: HOSPITAL | Age: 67
Discharge: HOME | End: 2017-09-02
Attending: EMERGENCY MEDICINE
Payer: MEDICARE

## 2017-09-02 VITALS
OXYGEN SATURATION: 97 % | TEMPERATURE: 98 F | DIASTOLIC BLOOD PRESSURE: 57 MMHG | WEIGHT: 168 LBS | HEIGHT: 71 IN | RESPIRATION RATE: 16 BRPM | BODY MASS INDEX: 23.52 KG/M2 | SYSTOLIC BLOOD PRESSURE: 108 MMHG | HEART RATE: 74 BPM

## 2017-09-02 DIAGNOSIS — S70.01XA CONTUSION OF RIGHT HIP, INITIAL ENCOUNTER: Primary | ICD-10-CM

## 2017-09-02 PROCEDURE — 99283 EMERGENCY DEPT VISIT LOW MDM: CPT

## 2017-09-02 RX ORDER — NAPROXEN 500 MG/1
500 TABLET ORAL 2 TIMES DAILY WITH MEALS
Qty: 30 TABLET | Refills: 0 | Status: SHIPPED | OUTPATIENT
Start: 2017-09-02 | End: 2017-11-27
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
## 2017-09-18 ENCOUNTER — TELEPHONE (OUTPATIENT)
Dept: FAMILY MEDICINE | Facility: CLINIC | Age: 67
End: 2017-09-18

## 2017-09-18 NOTE — TELEPHONE ENCOUNTER
----- Message from Talia Perez sent at 9/18/2017  3:22 PM CDT -----  Contact: jones   Went swimming past weekend    Possible wax build up in ear.   Hydrogen peroxide with Q tip, much worse   Call back   Or         Put olive oil in ear  Will that work

## 2017-09-21 DIAGNOSIS — I48.0 PAROXYSMAL ATRIAL FIBRILLATION: ICD-10-CM

## 2017-09-22 RX ORDER — RIVAROXABAN 20 MG/1
TABLET, FILM COATED ORAL
Qty: 30 TABLET | Refills: 0 | Status: SHIPPED | OUTPATIENT
Start: 2017-09-22 | End: 2017-10-21 | Stop reason: SDUPTHER

## 2017-10-21 DIAGNOSIS — I48.0 PAROXYSMAL ATRIAL FIBRILLATION: ICD-10-CM

## 2017-10-22 RX ORDER — RIVAROXABAN 20 MG/1
TABLET, FILM COATED ORAL
Qty: 30 TABLET | Refills: 11 | Status: SHIPPED | OUTPATIENT
Start: 2017-10-22 | End: 2018-07-19 | Stop reason: SDUPTHER

## 2017-11-21 DIAGNOSIS — M51.36 DDD (DEGENERATIVE DISC DISEASE), LUMBAR: ICD-10-CM

## 2017-11-21 DIAGNOSIS — Z13.6 SCREENING FOR AAA (ABDOMINAL AORTIC ANEURYSM): Primary | ICD-10-CM

## 2017-11-22 ENCOUNTER — OUTPATIENT CASE MANAGEMENT (OUTPATIENT)
Dept: ADMINISTRATIVE | Facility: OTHER | Age: 67
End: 2017-11-22

## 2017-11-22 NOTE — PROGRESS NOTES
Thank you for the referral. The following patient has been assigned to Contreras Calvillo RN with [facility] for high risk screening.    Reason for referral: DDD (degenerative disc disease), lumbar    Please contact [facility] at [phone] with any questions.    Thank you,  Farnaz Taylor

## 2017-11-24 ENCOUNTER — OUTPATIENT CASE MANAGEMENT (OUTPATIENT)
Dept: ADMINISTRATIVE | Facility: OTHER | Age: 67
End: 2017-11-24

## 2017-11-24 NOTE — PROGRESS NOTES
11/24/17- RN CM called and spoke with Mr. Duque in attempt to screen for OPCM. RN CM explained to patient purpose of OPCM. Patient unsure if he is in need of assistance at this time. Requested information about the program. RN CM will send patient contact information on OPCM. Encouraged patient to contact OPCM in the event that needs develop. Verbalized understanding. Will follow up with patient at a later date.

## 2017-11-27 ENCOUNTER — LAB VISIT (OUTPATIENT)
Dept: LAB | Facility: HOSPITAL | Age: 67
End: 2017-11-27
Attending: FAMILY MEDICINE
Payer: MEDICARE

## 2017-11-27 ENCOUNTER — OFFICE VISIT (OUTPATIENT)
Dept: FAMILY MEDICINE | Facility: CLINIC | Age: 67
End: 2017-11-27
Payer: MEDICARE

## 2017-11-27 VITALS
HEIGHT: 71 IN | TEMPERATURE: 99 F | DIASTOLIC BLOOD PRESSURE: 77 MMHG | WEIGHT: 169.75 LBS | SYSTOLIC BLOOD PRESSURE: 113 MMHG | HEART RATE: 72 BPM | BODY MASS INDEX: 23.77 KG/M2

## 2017-11-27 DIAGNOSIS — D50.1 IRON DEFICIENCY ANEMIA DUE TO SIDEROPENIC DYSPHAGIA: ICD-10-CM

## 2017-11-27 DIAGNOSIS — E46 PROTEIN-CALORIE MALNUTRITION, UNSPECIFIED SEVERITY: ICD-10-CM

## 2017-11-27 DIAGNOSIS — D50.1 IRON DEFICIENCY ANEMIA DUE TO SIDEROPENIC DYSPHAGIA: Primary | ICD-10-CM

## 2017-11-27 LAB
BASOPHILS # BLD AUTO: 0.05 K/UL
BASOPHILS NFR BLD: 1 %
DIFFERENTIAL METHOD: ABNORMAL
EOSINOPHIL # BLD AUTO: 0.1 K/UL
EOSINOPHIL NFR BLD: 1.2 %
ERYTHROCYTE [DISTWIDTH] IN BLOOD BY AUTOMATED COUNT: 13.3 %
FERRITIN SERPL-MCNC: 90 NG/ML
FOLATE SERPL-MCNC: 15.9 NG/ML
HCT VFR BLD AUTO: 42.1 %
HGB BLD-MCNC: 13.8 G/DL
IMM GRANULOCYTES # BLD AUTO: 0.01 K/UL
IMM GRANULOCYTES NFR BLD AUTO: 0.2 %
IRON SERPL-MCNC: 75 UG/DL
LYMPHOCYTES # BLD AUTO: 1.3 K/UL
LYMPHOCYTES NFR BLD: 25.9 %
MCH RBC QN AUTO: 32.6 PG
MCHC RBC AUTO-ENTMCNC: 32.8 G/DL
MCV RBC AUTO: 100 FL
MONOCYTES # BLD AUTO: 0.4 K/UL
MONOCYTES NFR BLD: 8.2 %
NEUTROPHILS # BLD AUTO: 3.2 K/UL
NEUTROPHILS NFR BLD: 63.5 %
NRBC BLD-RTO: 0 /100 WBC
PLATELET # BLD AUTO: 183 K/UL
PMV BLD AUTO: 9.5 FL
RBC # BLD AUTO: 4.23 M/UL
SATURATED IRON: 18 %
TOTAL IRON BINDING CAPACITY: 417 UG/DL
TRANSFERRIN SERPL-MCNC: 282 MG/DL
WBC # BLD AUTO: 4.98 K/UL

## 2017-11-27 PROCEDURE — 85025 COMPLETE CBC W/AUTO DIFF WBC: CPT

## 2017-11-27 PROCEDURE — 83540 ASSAY OF IRON: CPT

## 2017-11-27 PROCEDURE — 99214 OFFICE O/P EST MOD 30 MIN: CPT | Mod: S$PBB,,, | Performed by: FAMILY MEDICINE

## 2017-11-27 PROCEDURE — G0008 ADMIN INFLUENZA VIRUS VAC: HCPCS | Mod: PBBFAC,PO

## 2017-11-27 PROCEDURE — 99999 PR PBB SHADOW E&M-EST. PATIENT-LVL III: CPT | Mod: PBBFAC,,, | Performed by: FAMILY MEDICINE

## 2017-11-27 PROCEDURE — 82746 ASSAY OF FOLIC ACID SERUM: CPT

## 2017-11-27 PROCEDURE — 82728 ASSAY OF FERRITIN: CPT

## 2017-11-27 PROCEDURE — 36415 COLL VENOUS BLD VENIPUNCTURE: CPT | Mod: PO

## 2017-11-27 PROCEDURE — 99213 OFFICE O/P EST LOW 20 MIN: CPT | Mod: PBBFAC,PO,25 | Performed by: FAMILY MEDICINE

## 2017-11-27 RX ORDER — THIAMINE HCL 250 MG
250 TABLET ORAL DAILY
Qty: 90 TABLET | Refills: 11 | Status: SHIPPED | OUTPATIENT
Start: 2017-11-27

## 2017-11-27 RX ORDER — FERROUS SULFATE 325(65) MG
325 TABLET ORAL
Qty: 90 TABLET | Refills: 3 | COMMUNITY
Start: 2017-11-27 | End: 2019-05-11

## 2017-11-27 RX ORDER — ASCORBIC ACID 500 MG
500 TABLET ORAL DAILY
Qty: 90 TABLET | Refills: 3 | COMMUNITY
Start: 2017-11-27

## 2017-11-27 NOTE — PROGRESS NOTES
The ASCVD Risk score (Kathy ODEN Jr., et al., 2013) failed to calculate for the following reasons:    Cannot find a previous HDL lab    Cannot find a previous total cholesterol lab

## 2017-11-27 NOTE — PATIENT INSTRUCTIONS
Iron-Deficiency Anemia (Adult)  Red blood cells carry oxygen to the tissues of your body. Anemia is a condition in which you have too few red blood cells. You need iron to make red cells. Anemia makes you feel tired and run down. When anemia becomes severe, your skin becomes pale. You may feel short of breath after physical activity. Other symptoms include:  · Headaches  · Dizziness  · Leg cramps with physical activity  · Drowsiness  · Restless legs  Your anemia is caused by not having enough iron in your body. This may be because of:  · Loss of blood. This can be caused by heavy menstrual periods. It can also be caused by bleeding from the stomach or intestines.  · Poor diet. You may not be eating enough foods that contain iron.  · Inability to absorb iron from the foods you eat  · Pregnancy  If your blood count is low enough, your healthcare provider may prescribe an iron supplement. It usually takes about 2 to 3 months of treatment with iron supplements to correct anemia. Severe cases of anemia need a blood transfusion to quickly ease symptoms and deliver more oxygen to the cells.  Home care  Follow these guidelines when caring for yourself at home:  · Eat foods high in iron. This will boost the amount of iron stored in your body. It is a natural way to build up the number of blood cells. Good sources of iron include beef, liver, spinach and other dark green leafy vegetables, whole grains, beans, and nuts.  · Do not overexert yourself.  · Talk with your healthcare provider before traveling by air or traveling to high altitudes.  Follow-up care  Follow up with your healthcare provider in 2 months, or as advised. This is to have another red blood cell count to be sure your anemia has been fixed.  When to seek medical advice  Call your healthcare provider right away if any of these occur:  · Shortness of breath or chest pain  · Dizziness or fainting  · Vomiting blood or passing red or black-colored stool   Date  Last Reviewed: 2/25/2016  © 7367-3351 [employer]. [address] [place] [address], [place] [address]. All rights reserved. This information is not intended as a substitute for professional medical care. Always follow your healthcare professional's instructions.        Eating a Vegetarian Diet  A vegetarian diet is based on plant foods. It includes fruits, vegetables, beans, grains, seeds, and nuts. Some vegetarians also eat dairy foods and eggs. There are three common vegetarian diets:  · Lacto-ovo vegetarians eat eggs, yogurt, cheese, and other milk products, as well as plant foods.  · Lacto vegetarians eat dairy and plant foods but not eggs.  · Vegans eat only plant foods.  Why eat vegetarian?  People choose to be vegetarians for health, cultural, social, and Gnosticism reasons. A vegetarian diet is a healthy way to eat. You just have to plan your meals carefully so that you get all the nutrients you need. Most vegetarian diets are high in fiber and low in fat and cholesterol. That means eating vegetarian can:  · Lower your risk of heart disease.  · Lower your blood pressure and cholesterol levels.  · Help you maintain a healthy weight.  · Decrease digestive problems including:  ¨ Bowel diseases  ¨ Gallstones  ¨ Colon cancer  Vegetarian basics  A vegetarian diet can be a healthy way to eat for people of all ages. But meals and snacks must be planned to include non-meat sources of protein, vitamins, and other nutrients. (See the chart below.) Here are some guidelines for healthy meal planning:  · Eat a wide range of foods. This will help you get all the nutrients you need.  · Eat a number of plant proteins throughout the day.  · Plan for enough calories each day. Also make sure that your calories come from foods that are rich in protein, vitamins, and minerals.  · If you eat dairy foods, choose low-fat or fat-free milk, yogurt, or cheese.  Do you need supplements?  A vegetarian diet can easily supply all the  calories, protein, vitamins, and minerals that a person needs. But some people have special needs. They may include children and teens, pregnant and lactating women, women past midlife, the elderly, and vegans. If you are in one of these groups, you may need extra calories, protein, calcium, iron, vitamin B12, or zinc. The lists below can help you choose foods that are good sources of these nutrients. And be sure to ask your health care provider about taking vitamin supplements.  Protein  · Dried beans, soybeans, and lentils  · Tofu (bean curd) and tempeh (cultured soybeans)  · Rice, barley, and other whole grains  · Nuts and nut butter  · Milk, yogurt, and cheese  · Eggs Vitamin B12  · Milk, yogurt, and cheese  · Eggs  · Fortified soy burgers  · Fortified soy milk or other nondairy milk  · Fortified cereals  · Nutritional yeast   Zinc  · Milk, yogurt, and cheese  · Eggs  · Canned or dried beans  · Lentils and split peas  · Wheat germ  · Whole-grain breads and cereals  · Nuts and nut butters  · Pumpkin and sunflower seeds Calcium  · Milk, yogurt, and cheese  · Fortified soy milk or other nondairy milk  · Tofu processed with calcium sulfate  · Leafy, dark-green vegetables  · Dried figs  · Fortified orange juice and fortified cereals  · Sesame seeds  · Beans   Iron  · Wheat germ  · Dried fruits  · Nuts and seeds  · Whole grain and fortified breads and cereals  · Dried beans, lentils, and split peas  · Leafy, dark-green vegetables  · Eggs     Getting started  Change to a vegetarian diet slowly. Start by eating more grains, beans, vegetables, and fruits. Make fish, poultry, or meat a side dish. Then slowly cut them out of your diet. Here are some other tips:  · Eat three or more servings of vegetables a day. Eat them raw or lightly steamed.  · Eat two or more servings of fruit a day. Choose whole fruits with the skin on.  · Choose a wide range of grains and whole-grain breads and cereals. Eat six or more servings of  these foods each day.  · Begin to replace meat by working up to two to three servings a day of beans, lentils, split peas, tofu, or tempeh.  · If you eat dairy foods, have two to three servings a day. Make low-fat or fat-free choices.  For vegans: Add sources of calcium and vitamin B12, such as fortified nondairy milks and breakfast cereals. Talk to your health care provider about vitamin supplements.  To learn more  A registered dietitian (RD) can help you plan a healthy vegetarian diet. For more information and to find an RD who knows about vegetarian diets, search for one through the Vegetarian Nutrition Dietetic Practice Group of the Academy of Nutrition and Dietetics (AND) at their website, www.vegetariannutrition.net. You can also search AND's website, www.eatright.org. Other groups that can help include:  · Vegetarian Resource Group (www.vrg.org)  · American Cancer Society (www.cancer.org)  · American Heart Association (www.heart.org)  Date Last Reviewed: 6/1/2015  © 6340-0215 [employer]. [address] [place] [address]. All rights reserved. This information is not intended as a substitute for professional medical care. Always follow your healthcare professional's instructions.        Iron (Blood)  Does this test have other names?  Serum Fe  What is this test?  This test measures the level of iron in your blood.  Iron is an essential trace element in your blood. It helps your body make healthy red blood cells. Red blood cells carry oxygen throughout your bloodstream.  Having too little or too much iron can lead to health problems. Too little iron in your body can cause a condition called anemia. When this happens, your blood doesn't have enough iron to make the number of red blood cells needed to provide the amount of oxygen your body needs.  Iron-deficiency anemia is most often caused by blood loss, such as after an injury or surgery, or because of heavy menstrual bleeding.  Too much  iron can be caused by hemochromatosis. This is a genetic condition that causes your blood to absorb too much iron.  Why do I need this test?  You may need this test if your healthcare provider suspects that you have too much or too little iron in your blood. Common symptoms of anemia include:  · Headache  · Weakness  · Fatigue  · Irritability  · Trouble exercising because of shortness of breath  Less common symptoms of anemia are brittle nails, restless leg syndrome, and a sore tongue.  Signs and symptoms of too much iron include liver problems, weakness, fatigue, darkening of the skin, and joint pain.  What other tests might I have along with this test?  Your healthcare provider may also order a total iron binding capacity test to measure the level of transferrin in your blood. Transferrin is a protein that carries iron from your digestive system to the cells in your body that need it.  Your healthcare provider may also measure your level of ferritin, another protein that helps store iron in your body. He or she may also order a complete blood count, or CBC, to get the full picture of the parts of your blood.  What do my test results mean?  Many things may affect your lab test results. These include the method each lab uses to do the test. Even if your test results are different from the normal value, you may not have a problem. To learn what the results mean for you, talk with your healthcare provider.  Results are given in micrograms per deciliter (mcg/dL). Normal ranges of iron in the blood are 60 to 170 mcg/dL.  If your results are lower, it means you may have iron-deficiency anemia. Your healthcare provider will confirm this with other tests.  If your results are higher, it means you may have hemochromatosis.  Too much iron in the blood can also be from taking too many iron supplements or iron-enriched multivitamins.  How is this test done?  The test requires a blood sample, which is drawn through a needle  from a vein in your arm.  Does this test pose any risks?  Taking a blood sample with a needle carries risks that include bleeding, infection, bruising, or feeling dizzy. When the needle pricks your arm, you may feel a slight stinging sensation or pain. Afterward, the site may be slightly sore.  What might affect my test results?  Other factors aren't likely to affect your results.  How do I get ready for this test?  You don't need to prepare for this test.  © 8353-6062 [employer] [address] [place] [address]. All rights reserved. This information is not intended as a substitute for professional medical care. Always follow your healthcare professional's instructions.        Iron Supplements  Most people think of iron as a metal that is used to make pots, frying pans, and soup kettles. This same metal (or mineral) also plays a vital role in the body. Iron helps the blood cells carry oxygen. When you dont get enough iron, you may feel tired and lack energy. Over time, without enough iron, your body makes fewer red blood cells. This can cause a condition called iron-deficiency anemia. Since your body doesnt make iron, you must get it from foods or supplements.     Food sources of iron  Iron is found in a few types of foods. Good sources include:  · Red meat, poultry, fish, eggs  · Dried fruits (especially raisins, prunes, figs)  · Legumes such as dried beans and lentils  · Breads and cereals with iron added  · Blackstrap molasses  · Spinach  · Foods cooked in cast-iron pans. This is especially true of acidic foods, such as tomatoes and aracely.   Why use a supplement?  Women often need additional iron because they lose menstrual blood during their period. But even grown men and boys may need a supplement at some point. You may need an iron supplement if any of the following is true for you:  · I rarely eat foods that are high in iron (such as red meat, poultry, dried beans, and foods with  iron added).  · I am a vegetarian and I rarely eat legumes (dried beans, peas, lentils).  · I am a woman who has heavy menstrual periods.  · I am pregnant or breastfeeding.  · I have been diagnosed with iron-deficiency anemia.  If you take iron  Here are some tips to help you get the most from an iron supplement:  · Take it with vitamin C for better absorption.  · Dont take an iron supplement with milk. The calcium in milk limits iron absorption.  · Iron supplements can cause constipation. To prevent this, drink plenty of water, eat high-fiber foods, and exercise often. Also try an iron supplement with an added stool softener.  · Eat a healthy diet to get all the nutrients your body needs.  Recommended iron intake  The amount of iron each person needs is different, and varies by age. Women who are pregnant or breastfeeding need extra iron. But taking more than the suggested amount is not always healthy. Your health care provider can help you choose the right amount of iron for you.   Date Last Reviewed: 6/2/2015 © 2000-2017 [employer] [address] [place] [address], [place] [address]. All rights reserved. This information is not intended as a substitute for professional medical care. Always follow your healthcare professional's instructions.

## 2017-11-27 NOTE — PROGRESS NOTES
"Subjective:       Patient ID: Nicho Duque is a 66 y.o. male.    Chief Complaint: Fatigue and Tremors    HPI  Review of Systems   Constitutional: Positive for appetite change and fatigue. Negative for unexpected weight change.   HENT: Positive for hearing loss.    Respiratory: Negative for chest tightness and shortness of breath.    Cardiovascular: Negative for chest pain, palpitations and leg swelling.   Gastrointestinal: Negative for abdominal pain.   Musculoskeletal: Negative for arthralgias.   Neurological: Positive for tremors. Negative for dizziness, syncope, light-headedness and headaches.   Psychiatric/Behavioral: Positive for decreased concentration. The patient is nervous/anxious.        Patient Active Problem List   Diagnosis    Persistent atrial fibrillation    Hyperlipidemia    Degenerative disc disease    Lumbago    DDD (degenerative disc disease), lumbar    Tremors of nervous system    Depression    Hypotension due to drugs    Iron deficiency anemia due to chronic blood loss    Anemia    Olecranon bursitis of left elbow   Folstein Mini-Mental State Exam    I. ORIENTATION  Record Each Answer: (Maximum Score = 10)  What is today's date? 1  What is today's year?  1  What is the month? 1  What day is today? Day 1  Can you also tell me what season it is? 0  Can you also tell me the name of this clinic? 1  What floor are we on?  0  What city are we in? 1  What county are we in? 1  What state are we in?  1    FINAL SCORE: 9 1 ¨    II. IMMEDIATE RECALL (Maximum Score = 3)  Ball  1  Flag  1  Tree 1    Ask the subject if you may test his/her memory. Say "ball, "flag," "tree" clearly and slowly, about on second for each. Then ask the subject to repeat them. The first repetition determines the score.  If he/she does not repeat all three correctly, keep saying them up to six tries until he/she can repeat them  NUMBER OF TRIALS: 6    FINAL SCORE: 3    III. ATTENTION AND CALCULATION  A. Counting " "Backwards Test (Maximum Score = 5)  93  1  86  1  79  1  72  1  65  1    Ask the subject to begin with 100 and count backwards by 7. Record each response.  Any response 7 or less than the previous response is a correct response. The score is the number of correct subtractions.   For example, 93, 86, 80, 72, 65 is a score of 4;   93, 86, 78 70, 62, is 2;   92, 87, 78, 70, 65 is 0.    B. Spelling Backwards Test  D  1  L  1  R  0  O  1  W  1    Ask the subject to spell the word "WORLD" backwards.  Record each response. Use the instructions to determine which are correct responses.    C. Final Score   Compare the scores of the Counting Backwards and Spelling Backwards  tests. Use the greater of the two scores in deriving the TOTAL SCORE.    FINAL SCORE: 5  (Max of 5 or Greater of the two Scores)    IV. RECALL (Maximum Score = 3)  Ball  1  Flag  1  Tree  1    Ask the subject to recall the three words you previously asked him/her to remember.     FINAL SCORE: 3    V. Language (Maximum Score = 9)    Naming   Watch  1  Pencil 1 ¨ 1    Repetition  Ask the subject to repeat "No, ifs, ands, or buts."  1    Three -Stage Command  Takes paper in hand  1  Folds paper in half  1  Puts paper on floor  1     Establish the subject's dominant hand. Give the subject a sheet of blank paper and say,   "Take the paper in your right/left hand, fold it in half and put it on the floor."     Reading  Closes eyes  1    Hold up the card that reads, "Close your eyes." so the subject  can see it clearly.   Ask him/her to read it and do what it says.     Writing  Writes sentence  1    Give the subject a sheet of blank paper and ask him/her to write a sentence. It is to be written spontaneously. The sentence should be sensible, and contain a subject and a verb. Correct grammar and punctuation are not necessary.    Copying  Copies pentagons  1    Show the subject the drawing of the intersecting pentagons.  Ask him/her to draw the pentagons (about one " inch each side)  on the paper provided. Ten angles should be present,and two should intersect.  Ignore tremor and rotation.    FINAL SCORE: 3    TOTAL SCORE 24    24-30 Uncertain Cognitive Impairment  18-23 Mild to Moderate Cognitive Impairment    0-17 Severe Cognitive Impairment        Objective:      Physical Exam   Constitutional: He is oriented to person, place, and time. He appears well-developed and well-nourished. No distress.   HENT:   Head: Normocephalic and atraumatic.   Right Ear: External ear normal.   Left Ear: External ear normal.   Nose: Nose normal.   Mouth/Throat: Oropharynx is clear and moist. No oropharyngeal exudate.   Eyes: Conjunctivae and EOM are normal. Pupils are equal, round, and reactive to light. Right eye exhibits no discharge. Left eye exhibits no discharge. No scleral icterus.   Neck: Normal range of motion. Neck supple. No JVD present. No tracheal deviation present. No thyromegaly present.   Cardiovascular: Normal rate, normal heart sounds and intact distal pulses.    No murmur heard.  Pulmonary/Chest: Effort normal and breath sounds normal. No respiratory distress. He has no wheezes. He has no rales.   Abdominal: Soft. Bowel sounds are normal. He exhibits no distension and no mass. There is no tenderness. There is no rebound and no guarding.   Musculoskeletal: He exhibits no edema or tenderness.          Lymphadenopathy:     He has no cervical adenopathy.   Neurological: He is alert and oriented to person, place, and time. No cranial nerve deficit. He exhibits abnormal muscle tone. Coordination normal.   Skin: Skin is warm and dry. No rash noted. He is not diaphoretic. No erythema. No pallor.   Psychiatric: He has a normal mood and affect. His behavior is normal. Judgment and thought content normal.   Vitals reviewed.      Lab Results   Component Value Date    WBC 5.30 06/15/2017    HGB 12.5 (L) 06/15/2017    HCT 36.5 (L) 06/15/2017     06/15/2017    CHOL 208 (H) 07/13/2007     TRIG 61 07/13/2007    HDL 90 (H) 02/09/2013    ALT 23 06/15/2017    AST 37 06/15/2017     06/15/2017    K 4.2 06/15/2017     06/15/2017    CREATININE 1.0 08/28/2017    BUN 14 06/15/2017    CO2 27 06/15/2017    TSH 2.118 06/15/2017    PSA 0.9 07/13/2007    INR 1.1 06/15/2017     The ASCVD Risk score (Kathy AKSHAT Valladares., et al., 2013) failed to calculate for the following reasons:    Cannot find a previous HDL lab    Cannot find a previous total cholesterol lab  MRI with age related atrophy, vegan diet, and hx iron deficiency w/ hx polyps, abn weight loss due to decreased calory intake.  Assessment:       1. Iron deficiency anemia due to sideropenic dysphagia    2. Protein-calorie malnutrition, unspecified severity         Plan:       Iron deficiency anemia due to sideropenic dysphagia  -     thiamine 250 MG tablet; Take 1 tablet (250 mg total) by mouth once daily.  Dispense: 90 tablet; Refill: 11  -     CBC auto differential; Future; Expected date: 11/27/2017  -     Iron and TIBC; Future; Expected date: 11/27/2017  -     Ferritin; Future; Expected date: 11/27/2017  -     Folate; Future; Expected date: 11/27/2017  -     ferrous sulfate 325 mg (65 mg iron) Tab tablet; Take 1 tablet (325 mg total) by mouth daily with breakfast.  Dispense: 90 tablet; Refill: 3  -     ascorbic acid, vitamin C, (VITAMIN C) 500 MG tablet; Take 1 tablet (500 mg total) by mouth once daily.  Dispense: 90 tablet; Refill: 3    Protein-calorie malnutrition, unspecified severity   -     Folate; Future; Expected date: 11/27/2017        Time spent with patient: 30 minutes            This has been fully explained to the patient, who indicates understanding.

## 2017-12-04 ENCOUNTER — OUTPATIENT CASE MANAGEMENT (OUTPATIENT)
Dept: ADMINISTRATIVE | Facility: OTHER | Age: 67
End: 2017-12-04

## 2017-12-04 NOTE — PROGRESS NOTES
12/4/17-RN CM called patient in attempt to complete initial assessment for [facility]PCM. Patient reported that he did receive resource information from RN CM regarding the program. Stated that he does not have any needs at this time. Patient reported that he did have RN CM contact information for needs. Encouraged patient to contact OPCM RN in the event that needs develop. Verbalized understanding. Will close case at this time.

## 2018-03-06 ENCOUNTER — TELEPHONE (OUTPATIENT)
Dept: FAMILY MEDICINE | Facility: CLINIC | Age: 68
End: 2018-03-06

## 2018-03-06 ENCOUNTER — LAB VISIT (OUTPATIENT)
Dept: LAB | Facility: HOSPITAL | Age: 68
End: 2018-03-06
Attending: FAMILY MEDICINE
Payer: MEDICARE

## 2018-03-06 ENCOUNTER — OFFICE VISIT (OUTPATIENT)
Dept: FAMILY MEDICINE | Facility: CLINIC | Age: 68
End: 2018-03-06
Payer: MEDICARE

## 2018-03-06 VITALS
RESPIRATION RATE: 16 BRPM | OXYGEN SATURATION: 95 % | SYSTOLIC BLOOD PRESSURE: 98 MMHG | HEART RATE: 89 BPM | WEIGHT: 171.94 LBS | HEIGHT: 71 IN | DIASTOLIC BLOOD PRESSURE: 68 MMHG | BODY MASS INDEX: 24.07 KG/M2

## 2018-03-06 DIAGNOSIS — F32.A DEPRESSION, UNSPECIFIED DEPRESSION TYPE: Chronic | ICD-10-CM

## 2018-03-06 DIAGNOSIS — R00.2 PALPITATIONS: ICD-10-CM

## 2018-03-06 DIAGNOSIS — D53.9 MACROCYTIC ANEMIA: ICD-10-CM

## 2018-03-06 DIAGNOSIS — I48.19 PERSISTENT ATRIAL FIBRILLATION: ICD-10-CM

## 2018-03-06 DIAGNOSIS — R00.2 PALPITATIONS: Primary | ICD-10-CM

## 2018-03-06 DIAGNOSIS — I95.9 HYPOTENSION, UNSPECIFIED HYPOTENSION TYPE: ICD-10-CM

## 2018-03-06 DIAGNOSIS — D64.9 ANEMIA, UNSPECIFIED TYPE: ICD-10-CM

## 2018-03-06 LAB
BASOPHILS # BLD AUTO: 0.01 K/UL
BASOPHILS NFR BLD: 0.1 %
DIFFERENTIAL METHOD: ABNORMAL
EOSINOPHIL # BLD AUTO: 0 K/UL
EOSINOPHIL NFR BLD: 0.1 %
ERYTHROCYTE [DISTWIDTH] IN BLOOD BY AUTOMATED COUNT: 13.2 %
FOLATE SERPL-MCNC: 16.8 NG/ML
HCT VFR BLD AUTO: 42.7 %
HGB BLD-MCNC: 13.9 G/DL
LYMPHOCYTES # BLD AUTO: 1.2 K/UL
LYMPHOCYTES NFR BLD: 14.3 %
MCH RBC QN AUTO: 32 PG
MCHC RBC AUTO-ENTMCNC: 32.6 G/DL
MCV RBC AUTO: 98 FL
MONOCYTES # BLD AUTO: 0.4 K/UL
MONOCYTES NFR BLD: 4.4 %
NEUTROPHILS # BLD AUTO: 6.6 K/UL
NEUTROPHILS NFR BLD: 81.1 %
PLATELET # BLD AUTO: 194 K/UL
PMV BLD AUTO: 8.3 FL
RBC # BLD AUTO: 4.34 M/UL
VIT B12 SERPL-MCNC: 560 PG/ML
WBC # BLD AUTO: 8.12 K/UL

## 2018-03-06 PROCEDURE — 85025 COMPLETE CBC W/AUTO DIFF WBC: CPT | Mod: PO

## 2018-03-06 PROCEDURE — 80053 COMPREHEN METABOLIC PANEL: CPT

## 2018-03-06 PROCEDURE — 82728 ASSAY OF FERRITIN: CPT

## 2018-03-06 PROCEDURE — 93010 ELECTROCARDIOGRAM REPORT: CPT | Mod: ,,, | Performed by: INTERNAL MEDICINE

## 2018-03-06 PROCEDURE — 99214 OFFICE O/P EST MOD 30 MIN: CPT | Mod: PBBFAC,PO | Performed by: PHYSICIAN ASSISTANT

## 2018-03-06 PROCEDURE — 99999 PR PBB SHADOW E&M-EST. PATIENT-LVL IV: CPT | Mod: PBBFAC,,, | Performed by: PHYSICIAN ASSISTANT

## 2018-03-06 PROCEDURE — 83735 ASSAY OF MAGNESIUM: CPT

## 2018-03-06 PROCEDURE — 36415 COLL VENOUS BLD VENIPUNCTURE: CPT | Mod: PO

## 2018-03-06 PROCEDURE — 82607 VITAMIN B-12: CPT

## 2018-03-06 PROCEDURE — 99214 OFFICE O/P EST MOD 30 MIN: CPT | Mod: S$PBB,,, | Performed by: PHYSICIAN ASSISTANT

## 2018-03-06 PROCEDURE — 83540 ASSAY OF IRON: CPT

## 2018-03-06 PROCEDURE — 93005 ELECTROCARDIOGRAM TRACING: CPT | Mod: PBBFAC,PO | Performed by: PHYSICIAN ASSISTANT

## 2018-03-06 PROCEDURE — 82746 ASSAY OF FOLIC ACID SERUM: CPT

## 2018-03-06 PROCEDURE — 84443 ASSAY THYROID STIM HORMONE: CPT

## 2018-03-06 NOTE — PROGRESS NOTES
"Subjective:       Patient ID: Nicho Duque is a 67 y.o. male.    Chief Complaint: Follow-up (3mth f/u)    Mr. Duque comes to clinic today for follow up. He reports he has been feeling "well until this morning."  The patient reports that this morning he felt he was having palpitations and weakness. His blood pressure at that time was about 80/40. The patient reports his heart rate was 98 bpm. The patient reports that he is still not feeling well at this time. The patient does have persistent A fib; he is followed by Dr. De Los Santos, cardiologist. He has had several ablations. He reports he also has a medtronic loop recorder. The patient is followed by Dr. Araiza, neurologist, for chronic tremor.       Review of Systems   Constitutional: Positive for activity change. Negative for appetite change and fever.   HENT: Negative for postnasal drip, rhinorrhea and sinus pressure.    Eyes: Negative for visual disturbance.   Respiratory: Negative for cough and shortness of breath.    Cardiovascular: Positive for palpitations. Negative for chest pain.   Gastrointestinal: Negative for abdominal distention and abdominal pain.   Genitourinary: Negative for difficulty urinating and dysuria.   Musculoskeletal: Negative for arthralgias and myalgias.   Neurological: Positive for weakness. Negative for headaches.   Hematological: Negative for adenopathy.   Psychiatric/Behavioral: The patient is not nervous/anxious.        Objective:      Physical Exam   Constitutional: He is oriented to person, place, and time.   Patient appears pale   HENT:   Mouth/Throat: Oropharynx is clear and moist. No oropharyngeal exudate.   Eyes: Conjunctivae are normal. Pupils are equal, round, and reactive to light.   Cardiovascular: Normal rate and regular rhythm.    Pulmonary/Chest: Effort normal and breath sounds normal. He has no wheezes.   Abdominal: Soft. Bowel sounds are normal. There is no tenderness.   Musculoskeletal: He exhibits no edema. "   Lymphadenopathy:     He has no cervical adenopathy.   Neurological: He is alert and oriented to person, place, and time.   Skin: No erythema.   Psychiatric: His behavior is normal.       Assessment:       1. Depression, unspecified depression type    2. Persistent atrial fibrillation    3. Palpitations    4. Hypotension, unspecified hypotension type    5. Anemia, unspecified type    6. Macrocytic anemia        Plan:       Nicho was seen today for follow-up.    Diagnoses and all orders for this visit:    Palpitations  -     EKG 12-lead  -     Comprehensive metabolic panel; Future  -     TSH; Future  -     CBC auto differential; Future  -     Urinalysis; Future  -     Magnesium; Future    Depression, unspecified depression type  -     TSH; Future    Persistent atrial fibrillation  -     EKG 12-lead  -     Comprehensive metabolic panel; Future  -     TSH; Future  -     CBC auto differential; Future  -     Magnesium; Future    Hypotension, unspecified hypotension type  -     EKG 12-lead  -     Comprehensive metabolic panel; Future  -     TSH; Future  -     CBC auto differential; Future  -     Urinalysis; Future    Anemia, unspecified type  -     Cancel: CBC auto differential; Future  -     Iron and TIBC; Future  -     Ferritin; Future  -     CBC auto differential; Future  -     Vitamin B12; Future  -     Folate; Future    Macrocytic anemia  -     Vitamin B12; Future  -     Folate; Future    Ekg reviewed with Dr. Potts  Patient to have labs today  Patient to see cardiology ASAP; we will contact Dr. De Los Santos's office to see if they can see patient in next week.

## 2018-03-06 NOTE — TELEPHONE ENCOUNTER
Patient seen in office today. Patient complained of palpitations and low blood pressure readings; we are evaluating this. The patient is followed by Dr. De Los Santos, cardiologist. Please request most recent cardiac studies from Dr. De Los Santos's office. Release of information form signed.  Patient needs an appt with Dr. Filiberto FU. Please contact his office to facilitate scheduling the patient an appointment.

## 2018-03-07 DIAGNOSIS — R74.01 TRANSAMINITIS: Primary | ICD-10-CM

## 2018-03-07 DIAGNOSIS — E87.5 HYPERKALEMIA: ICD-10-CM

## 2018-03-07 LAB
ALBUMIN SERPL BCP-MCNC: 4.1 G/DL
ALP SERPL-CCNC: 102 U/L
ALT SERPL W/O P-5'-P-CCNC: 93 U/L
ANION GAP SERPL CALC-SCNC: 12 MMOL/L
AST SERPL-CCNC: 101 U/L
BILIRUB SERPL-MCNC: 0.6 MG/DL
BUN SERPL-MCNC: 19 MG/DL
CALCIUM SERPL-MCNC: 10 MG/DL
CHLORIDE SERPL-SCNC: 102 MMOL/L
CO2 SERPL-SCNC: 27 MMOL/L
CREAT SERPL-MCNC: 1.2 MG/DL
EST. GFR  (AFRICAN AMERICAN): >60 ML/MIN/1.73 M^2
EST. GFR  (NON AFRICAN AMERICAN): >60 ML/MIN/1.73 M^2
FERRITIN SERPL-MCNC: 96 NG/ML
GLUCOSE SERPL-MCNC: 163 MG/DL
IRON SERPL-MCNC: 66 UG/DL
MAGNESIUM SERPL-MCNC: 1.7 MG/DL
POTASSIUM SERPL-SCNC: 5.2 MMOL/L
PROT SERPL-MCNC: 7 G/DL
SATURATED IRON: 16 %
SODIUM SERPL-SCNC: 141 MMOL/L
TOTAL IRON BINDING CAPACITY: 410 UG/DL
TRANSFERRIN SERPL-MCNC: 277 MG/DL
TSH SERPL DL<=0.005 MIU/L-ACNC: 1.17 UIU/ML

## 2018-03-07 NOTE — TELEPHONE ENCOUNTER
Called and spoke to patient. Patient states that he received a call and spoke to Dr. De Los Santos on today 3/7/18 regarding his cardiac studies. Per Dr De Los Santos patient EKG had a heart rate at 124 with a normal sinus rhythm. Patient states that he will call Dr. De Los Santos office to scheduled an follow up appointment.

## 2018-03-14 ENCOUNTER — HOSPITAL ENCOUNTER (OUTPATIENT)
Dept: RADIOLOGY | Facility: CLINIC | Age: 68
Discharge: HOME | End: 2018-03-14
Attending: PHYSICIAN ASSISTANT
Payer: MEDICARE

## 2018-03-14 DIAGNOSIS — R74.01 TRANSAMINITIS: ICD-10-CM

## 2018-03-14 PROCEDURE — 76700 US EXAM ABDOM COMPLETE: CPT | Mod: 26,,, | Performed by: RADIOLOGY

## 2018-03-14 PROCEDURE — 76700 US EXAM ABDOM COMPLETE: CPT | Mod: TC,PO

## 2018-03-20 RX ORDER — ESCITALOPRAM OXALATE 20 MG/1
TABLET ORAL
Qty: 90 TABLET | Refills: 0 | Status: SHIPPED | OUTPATIENT
Start: 2018-03-20 | End: 2018-06-18 | Stop reason: SDUPTHER

## 2018-03-26 ENCOUNTER — HOSPITAL ENCOUNTER (EMERGENCY)
Facility: HOSPITAL | Age: 68
Discharge: HOME | End: 2018-03-26
Attending: EMERGENCY MEDICINE
Payer: MEDICARE

## 2018-03-26 VITALS
RESPIRATION RATE: 14 BRPM | SYSTOLIC BLOOD PRESSURE: 111 MMHG | OXYGEN SATURATION: 97 % | HEART RATE: 61 BPM | BODY MASS INDEX: 23.99 KG/M2 | DIASTOLIC BLOOD PRESSURE: 58 MMHG | WEIGHT: 172 LBS

## 2018-03-26 DIAGNOSIS — R42 DIZZINESS: ICD-10-CM

## 2018-03-26 DIAGNOSIS — F10.920 ALCOHOLIC INTOXICATION WITHOUT COMPLICATION: ICD-10-CM

## 2018-03-26 DIAGNOSIS — S42.251A CLOSED DISPLACED FRACTURE OF GREATER TUBEROSITY OF RIGHT HUMERUS, INITIAL ENCOUNTER: Primary | ICD-10-CM

## 2018-03-26 DIAGNOSIS — T14.8XXA DISLOCATION CLOSED: ICD-10-CM

## 2018-03-26 DIAGNOSIS — S60.222A CONTUSION OF LEFT HAND, INITIAL ENCOUNTER: ICD-10-CM

## 2018-03-26 LAB
ALBUMIN SERPL BCP-MCNC: 4.2 G/DL
ALP SERPL-CCNC: 102 U/L
ALT SERPL W/O P-5'-P-CCNC: 28 U/L
ANION GAP SERPL CALC-SCNC: 16 MMOL/L
AST SERPL-CCNC: 47 U/L
BASOPHILS # BLD AUTO: 0 K/UL
BASOPHILS NFR BLD: 0.2 %
BILIRUB SERPL-MCNC: 0.7 MG/DL
BUN SERPL-MCNC: 12 MG/DL
CALCIUM SERPL-MCNC: 9.6 MG/DL
CHLORIDE SERPL-SCNC: 104 MMOL/L
CO2 SERPL-SCNC: 24 MMOL/L
CREAT SERPL-MCNC: 0.8 MG/DL
DIFFERENTIAL METHOD: ABNORMAL
EOSINOPHIL # BLD AUTO: 0 K/UL
EOSINOPHIL NFR BLD: 0.2 %
ERYTHROCYTE [DISTWIDTH] IN BLOOD BY AUTOMATED COUNT: 14.4 %
EST. GFR  (AFRICAN AMERICAN): >60 ML/MIN/1.73 M^2
EST. GFR  (NON AFRICAN AMERICAN): >60 ML/MIN/1.73 M^2
ETHANOL SERPL-MCNC: 252 MG/DL
GLUCOSE SERPL-MCNC: 107 MG/DL
HCT VFR BLD AUTO: 43.2 %
HGB BLD-MCNC: 14.4 G/DL
LYMPHOCYTES # BLD AUTO: 1.1 K/UL
LYMPHOCYTES NFR BLD: 12.3 %
MCH RBC QN AUTO: 32 PG
MCHC RBC AUTO-ENTMCNC: 33.3 G/DL
MCV RBC AUTO: 96 FL
MONOCYTES # BLD AUTO: 0.2 K/UL
MONOCYTES NFR BLD: 2.6 %
NEUTROPHILS # BLD AUTO: 7.5 K/UL
NEUTROPHILS NFR BLD: 84.7 %
PLATELET # BLD AUTO: 208 K/UL
PMV BLD AUTO: 6.7 FL
POTASSIUM SERPL-SCNC: 3.9 MMOL/L
PROT SERPL-MCNC: 7.4 G/DL
RBC # BLD AUTO: 4.51 M/UL
SODIUM SERPL-SCNC: 144 MMOL/L
TROPONIN I SERPL DL<=0.01 NG/ML-MCNC: <0.006 NG/ML
WBC # BLD AUTO: 8.9 K/UL

## 2018-03-26 PROCEDURE — 63600175 PHARM REV CODE 636 W HCPCS: Performed by: EMERGENCY MEDICINE

## 2018-03-26 PROCEDURE — 96374 THER/PROPH/DIAG INJ IV PUSH: CPT

## 2018-03-26 PROCEDURE — 80320 DRUG SCREEN QUANTALCOHOLS: CPT

## 2018-03-26 PROCEDURE — 84484 ASSAY OF TROPONIN QUANT: CPT

## 2018-03-26 PROCEDURE — 93005 ELECTROCARDIOGRAM TRACING: CPT

## 2018-03-26 PROCEDURE — 80053 COMPREHEN METABOLIC PANEL: CPT

## 2018-03-26 PROCEDURE — 36415 COLL VENOUS BLD VENIPUNCTURE: CPT

## 2018-03-26 PROCEDURE — 99285 EMERGENCY DEPT VISIT HI MDM: CPT | Mod: 25

## 2018-03-26 PROCEDURE — 94770 HC EXHALED C02 TEST: CPT | Mod: 59

## 2018-03-26 PROCEDURE — 85025 COMPLETE CBC W/AUTO DIFF WBC: CPT

## 2018-03-26 PROCEDURE — 99900035 HC TECH TIME PER 15 MIN (STAT)

## 2018-03-26 PROCEDURE — 23665 CLTX SHO DSLC FX GR HMRL TBR: CPT | Mod: RT

## 2018-03-26 RX ORDER — HYDROCODONE BITARTRATE AND ACETAMINOPHEN 5; 325 MG/1; MG/1
1-2 TABLET ORAL EVERY 4 HOURS PRN
Qty: 20 TABLET | Refills: 0 | Status: SHIPPED | OUTPATIENT
Start: 2018-03-26 | End: 2018-04-05

## 2018-03-26 RX ORDER — PROPOFOL 10 MG/ML
75 VIAL (ML) INTRAVENOUS ONCE
Status: COMPLETED | OUTPATIENT
Start: 2018-03-26 | End: 2018-03-26

## 2018-03-26 RX ORDER — HYDROMORPHONE HYDROCHLORIDE 2 MG/ML
0.5 INJECTION, SOLUTION INTRAMUSCULAR; INTRAVENOUS; SUBCUTANEOUS
Status: COMPLETED | OUTPATIENT
Start: 2018-03-26 | End: 2018-03-26

## 2018-03-26 RX ADMIN — HYDROMORPHONE HYDROCHLORIDE 0.5 MG: 2 INJECTION, SOLUTION INTRAMUSCULAR; INTRAVENOUS; SUBCUTANEOUS at 02:03

## 2018-03-26 RX ADMIN — PROPOFOL 75 MG: 10 INJECTION, EMULSION INTRAVENOUS at 03:03

## 2018-03-26 NOTE — ED NOTES
Pt awake alert oriented to self situation, pt reports walking down stairs and getting dizzy and falling, pt co rt shoulder pain with obvious deformity noted, bilateral radial pulses +2, denies chest pain or sob, family at bedside
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
## 2018-03-27 ENCOUNTER — TELEPHONE (OUTPATIENT)
Dept: FAMILY MEDICINE | Facility: CLINIC | Age: 68
End: 2018-03-27

## 2018-03-27 ENCOUNTER — PES CALL (OUTPATIENT)
Dept: ADMINISTRATIVE | Facility: CLINIC | Age: 68
End: 2018-03-27

## 2018-03-27 ENCOUNTER — OFFICE VISIT (OUTPATIENT)
Dept: ORTHOPEDICS | Facility: CLINIC | Age: 68
End: 2018-03-27
Payer: MEDICARE

## 2018-03-27 ENCOUNTER — TELEPHONE (OUTPATIENT)
Dept: ORTHOPEDICS | Facility: CLINIC | Age: 68
End: 2018-03-27

## 2018-03-27 VITALS
HEART RATE: 64 BPM | SYSTOLIC BLOOD PRESSURE: 119 MMHG | DIASTOLIC BLOOD PRESSURE: 70 MMHG | HEIGHT: 71 IN | WEIGHT: 172 LBS | BODY MASS INDEX: 24.08 KG/M2

## 2018-03-27 DIAGNOSIS — S42.291A OTHER CLOSED DISPLACED FRACTURE OF PROXIMAL END OF RIGHT HUMERUS, INITIAL ENCOUNTER: Primary | ICD-10-CM

## 2018-03-27 PROCEDURE — 99203 OFFICE O/P NEW LOW 30 MIN: CPT | Mod: S$PBB,,, | Performed by: ORTHOPAEDIC SURGERY

## 2018-03-27 PROCEDURE — 99999 PR PBB SHADOW E&M-EST. PATIENT-LVL III: CPT | Mod: PBBFAC,,, | Performed by: ORTHOPAEDIC SURGERY

## 2018-03-27 PROCEDURE — 99213 OFFICE O/P EST LOW 20 MIN: CPT | Mod: PBBFAC,PN | Performed by: ORTHOPAEDIC SURGERY
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
## 2018-03-27 NOTE — TELEPHONE ENCOUNTER
----- Message from Waleska Siddiqui RT sent at 3/27/2018  8:05 AM CDT -----  Contact: Rama,Wife, [phone]   Sissy,Wife, [phone], requesting an appt for the pt to be worked in today, F/U [facility] / Fx upper Rt arm, thanks.
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
## 2018-04-10 ENCOUNTER — OFFICE VISIT (OUTPATIENT)
Dept: ORTHOPEDICS | Facility: CLINIC | Age: 68
End: 2018-04-10
Payer: MEDICARE

## 2018-04-10 ENCOUNTER — HOSPITAL ENCOUNTER (OUTPATIENT)
Dept: RADIOLOGY | Facility: HOSPITAL | Age: 68
Discharge: HOME | End: 2018-04-10
Attending: ORTHOPAEDIC SURGERY
Payer: MEDICARE

## 2018-04-10 VITALS
DIASTOLIC BLOOD PRESSURE: 56 MMHG | HEART RATE: 78 BPM | HEIGHT: 71 IN | WEIGHT: 171.94 LBS | BODY MASS INDEX: 24.07 KG/M2 | SYSTOLIC BLOOD PRESSURE: 95 MMHG

## 2018-04-10 DIAGNOSIS — S42.291A OTHER CLOSED DISPLACED FRACTURE OF PROXIMAL END OF RIGHT HUMERUS, INITIAL ENCOUNTER: Primary | ICD-10-CM

## 2018-04-10 DIAGNOSIS — M25.511 RIGHT SHOULDER PAIN, UNSPECIFIED CHRONICITY: Primary | ICD-10-CM

## 2018-04-10 DIAGNOSIS — M25.511 RIGHT SHOULDER PAIN, UNSPECIFIED CHRONICITY: ICD-10-CM

## 2018-04-10 PROCEDURE — 99213 OFFICE O/P EST LOW 20 MIN: CPT | Mod: PBBFAC,25,PN | Performed by: ORTHOPAEDIC SURGERY

## 2018-04-10 PROCEDURE — 99213 OFFICE O/P EST LOW 20 MIN: CPT | Mod: S$PBB,,, | Performed by: ORTHOPAEDIC SURGERY

## 2018-04-10 PROCEDURE — 99999 PR PBB SHADOW E&M-EST. PATIENT-LVL III: CPT | Mod: PBBFAC,,, | Performed by: ORTHOPAEDIC SURGERY

## 2018-04-10 PROCEDURE — 73030 X-RAY EXAM OF SHOULDER: CPT | Mod: 26,RT,, | Performed by: RADIOLOGY

## 2018-04-10 PROCEDURE — 73030 X-RAY EXAM OF SHOULDER: CPT | Mod: TC,PN,RT

## 2018-04-10 RX ORDER — OXYCODONE AND ACETAMINOPHEN 5; 325 MG/1; MG/1
TABLET ORAL
Refills: 0 | COMMUNITY
Start: 2018-03-27 | End: 2018-06-19 | Stop reason: SDUPTHER

## 2018-04-10 RX ORDER — CLONAZEPAM 1 MG/1
TABLET ORAL
COMMUNITY
Start: 2018-03-23 | End: 2018-06-19 | Stop reason: SDUPTHER
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
## 2018-05-07 DIAGNOSIS — S42.291G OTHER CLOSED DISPLACED FRACTURE OF PROXIMAL END OF RIGHT HUMERUS WITH DELAYED HEALING, SUBSEQUENT ENCOUNTER: Primary | ICD-10-CM

## 2018-05-08 ENCOUNTER — HOSPITAL ENCOUNTER (OUTPATIENT)
Dept: RADIOLOGY | Facility: HOSPITAL | Age: 68
Discharge: HOME | End: 2018-05-08
Attending: ORTHOPAEDIC SURGERY
Payer: MEDICARE

## 2018-05-08 ENCOUNTER — OFFICE VISIT (OUTPATIENT)
Dept: ORTHOPEDICS | Facility: CLINIC | Age: 68
End: 2018-05-08
Payer: MEDICARE

## 2018-05-08 VITALS
DIASTOLIC BLOOD PRESSURE: 55 MMHG | BODY MASS INDEX: 24.07 KG/M2 | SYSTOLIC BLOOD PRESSURE: 94 MMHG | HEART RATE: 72 BPM | WEIGHT: 171.94 LBS | HEIGHT: 71 IN

## 2018-05-08 DIAGNOSIS — S42.291G OTHER CLOSED DISPLACED FRACTURE OF PROXIMAL END OF RIGHT HUMERUS WITH DELAYED HEALING, SUBSEQUENT ENCOUNTER: ICD-10-CM

## 2018-05-08 DIAGNOSIS — S42.291A OTHER CLOSED DISPLACED FRACTURE OF PROXIMAL END OF RIGHT HUMERUS, INITIAL ENCOUNTER: Primary | ICD-10-CM

## 2018-05-08 PROCEDURE — 73030 X-RAY EXAM OF SHOULDER: CPT | Mod: 26,RT,, | Performed by: RADIOLOGY

## 2018-05-08 PROCEDURE — 99999 PR PBB SHADOW E&M-EST. PATIENT-LVL III: CPT | Mod: PBBFAC,,, | Performed by: ORTHOPAEDIC SURGERY

## 2018-05-08 PROCEDURE — 99213 OFFICE O/P EST LOW 20 MIN: CPT | Mod: S$PBB,,, | Performed by: ORTHOPAEDIC SURGERY

## 2018-05-08 PROCEDURE — 99213 OFFICE O/P EST LOW 20 MIN: CPT | Mod: PBBFAC,25,PN | Performed by: ORTHOPAEDIC SURGERY

## 2018-05-08 PROCEDURE — 73030 X-RAY EXAM OF SHOULDER: CPT | Mod: TC,PN,RT
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
## 2018-05-21 ENCOUNTER — TELEPHONE (OUTPATIENT)
Dept: ORTHOPEDICS | Facility: CLINIC | Age: 68
End: 2018-05-21

## 2018-05-21 DIAGNOSIS — S42.251A CLOSED DISPLACED FRACTURE OF GREATER TUBEROSITY OF RIGHT HUMERUS, INITIAL ENCOUNTER: Primary | ICD-10-CM

## 2018-05-21 RX ORDER — OXYCODONE AND ACETAMINOPHEN 5; 325 MG/1; MG/1
1 TABLET ORAL EVERY 6 HOURS PRN
Qty: 30 TABLET | Refills: 0 | Status: SHIPPED | OUTPATIENT
Start: 2018-05-21 | End: 2018-06-20

## 2018-05-21 NOTE — TELEPHONE ENCOUNTER
----- Message from Iveth Gee sent at 5/18/2018  3:41 PM CDT -----  Contact: Patient  Nicho, patient [phone] calling because he started physical therapy last week and was advised to take a pain pill before he goes in for physical therapy, so patient needs a refill on oxyCODONE-acetaminophen (PERCOCET) 5-325 mg per tablet because he only has one tablet left. Patient has therapy twice a week. Please advise. Thanks.    [facility] [id number]  [address] [facility] [address]  Phone: [phone] Fax: [phone]

## 2018-05-23 ENCOUNTER — OUTPATIENT CASE MANAGEMENT (OUTPATIENT)
Dept: ADMINISTRATIVE | Facility: OTHER | Age: 68
End: 2018-05-23

## 2018-05-23 NOTE — PROGRESS NOTES
Thank you for the referral. The following patient has been assigned to Maribel Magallon RN  with Outpatient Complex Care Management for high risk screening.    Reason for referral: Closed displaced fracture of greater tuberosity of right humerus, initial encounter    Please contact hospitals at [phone] with any questions.    Thank you,    Mariluz Bates, [facility] [place]  [facility]  Ext [phone]

## 2018-05-28 ENCOUNTER — OUTPATIENT CASE MANAGEMENT (OUTPATIENT)
Dept: ADMINISTRATIVE | Facility: OTHER | Age: 68
End: 2018-05-28

## 2018-05-28 NOTE — PROGRESS NOTES
05/28/18-Attempt to screen patient for outpatient case management. Declined OPCM, will mail my card and O[facility] brochure.

## 2018-06-14 DIAGNOSIS — M25.511 RIGHT SHOULDER PAIN, UNSPECIFIED CHRONICITY: Primary | ICD-10-CM

## 2018-06-18 RX ORDER — ESCITALOPRAM OXALATE 20 MG/1
TABLET ORAL
Qty: 90 TABLET | Refills: 0 | Status: SHIPPED | OUTPATIENT
Start: 2018-06-18 | End: 2018-09-16 | Stop reason: SDUPTHER

## 2018-06-19 ENCOUNTER — HOSPITAL ENCOUNTER (OUTPATIENT)
Dept: RADIOLOGY | Facility: HOSPITAL | Age: 68
Discharge: HOME | End: 2018-06-19
Attending: ORTHOPAEDIC SURGERY
Payer: MEDICARE

## 2018-06-19 ENCOUNTER — OFFICE VISIT (OUTPATIENT)
Dept: ORTHOPEDICS | Facility: CLINIC | Age: 68
End: 2018-06-19
Payer: MEDICARE

## 2018-06-19 VITALS
WEIGHT: 171.94 LBS | DIASTOLIC BLOOD PRESSURE: 65 MMHG | BODY MASS INDEX: 24.07 KG/M2 | SYSTOLIC BLOOD PRESSURE: 122 MMHG | HEART RATE: 69 BPM | HEIGHT: 71 IN

## 2018-06-19 DIAGNOSIS — M25.511 RIGHT SHOULDER PAIN, UNSPECIFIED CHRONICITY: ICD-10-CM

## 2018-06-19 DIAGNOSIS — S42.291A OTHER CLOSED DISPLACED FRACTURE OF PROXIMAL END OF RIGHT HUMERUS, INITIAL ENCOUNTER: Primary | ICD-10-CM

## 2018-06-19 PROCEDURE — 73030 X-RAY EXAM OF SHOULDER: CPT | Mod: 26,RT,, | Performed by: RADIOLOGY

## 2018-06-19 PROCEDURE — 99999 PR PBB SHADOW E&M-EST. PATIENT-LVL III: CPT | Mod: PBBFAC,,, | Performed by: ORTHOPAEDIC SURGERY

## 2018-06-19 PROCEDURE — 73030 X-RAY EXAM OF SHOULDER: CPT | Mod: TC,PN,RT

## 2018-06-19 PROCEDURE — 99213 OFFICE O/P EST LOW 20 MIN: CPT | Mod: PBBFAC,25,PN | Performed by: ORTHOPAEDIC SURGERY

## 2018-06-19 PROCEDURE — 99213 OFFICE O/P EST LOW 20 MIN: CPT | Mod: S$PBB,,, | Performed by: ORTHOPAEDIC SURGERY
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
## 2018-07-18 ENCOUNTER — TELEPHONE (OUTPATIENT)
Dept: FAMILY MEDICINE | Facility: CLINIC | Age: 68
End: 2018-07-18

## 2018-07-18 DIAGNOSIS — I48.0 PAROXYSMAL ATRIAL FIBRILLATION: ICD-10-CM

## 2018-07-18 NOTE — TELEPHONE ENCOUNTER
----- Message from Rosario Juarez sent at 7/18/2018  9:27 AM CDT -----  Contact: self  Patient want to speak with a nurse regarding scheduling appointment today for throwing up please call back at [phone]

## 2018-07-18 NOTE — TELEPHONE ENCOUNTER
Spoke to patient who states he vomited on Saturday 7-14-18. States since that time his esophagus has been irritated. States he is now coughing up a copious amount of white phlegm. Patient is requesting a same day appointment. No available appointment noted in [facility]. Advised patient to seek evaluation at an urgent care facility or ER. Patient verbalized understanding.

## 2018-07-19 RX ORDER — RIVAROXABAN 20 MG/1
TABLET, FILM COATED ORAL
Qty: 30 TABLET | Refills: 0 | Status: SHIPPED | OUTPATIENT
Start: 2018-07-19 | End: 2018-10-10 | Stop reason: SDUPTHER

## 2018-07-25 DIAGNOSIS — M25.511 RIGHT SHOULDER PAIN, UNSPECIFIED CHRONICITY: Primary | ICD-10-CM

## 2018-07-31 ENCOUNTER — HOSPITAL ENCOUNTER (OUTPATIENT)
Dept: RADIOLOGY | Facility: HOSPITAL | Age: 68
Discharge: HOME | End: 2018-07-31
Attending: ORTHOPAEDIC SURGERY
Payer: MEDICARE

## 2018-07-31 ENCOUNTER — OFFICE VISIT (OUTPATIENT)
Dept: ORTHOPEDICS | Facility: CLINIC | Age: 68
End: 2018-07-31
Payer: MEDICARE

## 2018-07-31 VITALS
SYSTOLIC BLOOD PRESSURE: 126 MMHG | HEIGHT: 71 IN | HEART RATE: 65 BPM | BODY MASS INDEX: 24.07 KG/M2 | WEIGHT: 171.94 LBS | DIASTOLIC BLOOD PRESSURE: 67 MMHG

## 2018-07-31 DIAGNOSIS — M25.511 RIGHT SHOULDER PAIN, UNSPECIFIED CHRONICITY: ICD-10-CM

## 2018-07-31 DIAGNOSIS — S42.291D OTHER CLOSED DISPLACED FRACTURE OF PROXIMAL END OF RIGHT HUMERUS WITH ROUTINE HEALING, SUBSEQUENT ENCOUNTER: Primary | ICD-10-CM

## 2018-07-31 DIAGNOSIS — S76.012A HIP STRAIN, LEFT, INITIAL ENCOUNTER: ICD-10-CM

## 2018-07-31 PROCEDURE — 99214 OFFICE O/P EST MOD 30 MIN: CPT | Mod: S$PBB,,, | Performed by: ORTHOPAEDIC SURGERY

## 2018-07-31 PROCEDURE — 99999 PR PBB SHADOW E&M-EST. PATIENT-LVL III: CPT | Mod: PBBFAC,,, | Performed by: ORTHOPAEDIC SURGERY

## 2018-07-31 PROCEDURE — 73030 X-RAY EXAM OF SHOULDER: CPT | Mod: TC,PN,RT

## 2018-07-31 PROCEDURE — 99213 OFFICE O/P EST LOW 20 MIN: CPT | Mod: PBBFAC,25,PN | Performed by: ORTHOPAEDIC SURGERY

## 2018-07-31 PROCEDURE — 73030 X-RAY EXAM OF SHOULDER: CPT | Mod: 26,RT,, | Performed by: RADIOLOGY

## 2018-07-31 RX ORDER — CLONAZEPAM 1 MG/1
TABLET ORAL
Refills: 1 | COMMUNITY
Start: 2018-07-26
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
## 2018-09-17 RX ORDER — ESCITALOPRAM OXALATE 20 MG/1
TABLET ORAL
Qty: 90 TABLET | Refills: 0 | Status: SHIPPED | OUTPATIENT
Start: 2018-09-17 | End: 2019-02-12 | Stop reason: SDUPTHER

## 2018-10-10 DIAGNOSIS — I48.0 PAROXYSMAL ATRIAL FIBRILLATION: ICD-10-CM

## 2018-10-10 RX ORDER — RIVAROXABAN 20 MG/1
TABLET, FILM COATED ORAL
Qty: 30 TABLET | Refills: 0 | Status: SHIPPED | OUTPATIENT
Start: 2018-10-10 | End: 2019-02-12 | Stop reason: SDUPTHER

## 2018-11-20 ENCOUNTER — OFFICE VISIT (OUTPATIENT)
Dept: URGENT CARE | Facility: CLINIC | Age: 68
End: 2018-11-20
Payer: MEDICARE

## 2018-11-20 VITALS
RESPIRATION RATE: 14 BRPM | TEMPERATURE: 99 F | SYSTOLIC BLOOD PRESSURE: 104 MMHG | HEIGHT: 71 IN | BODY MASS INDEX: 23.63 KG/M2 | HEART RATE: 76 BPM | OXYGEN SATURATION: 97 % | WEIGHT: 168.81 LBS | DIASTOLIC BLOOD PRESSURE: 68 MMHG

## 2018-11-20 DIAGNOSIS — M25.552 LEFT HIP PAIN: Primary | ICD-10-CM

## 2018-11-20 DIAGNOSIS — T14.8XXA HEMATOMA: ICD-10-CM

## 2018-11-20 PROCEDURE — 72170 X-RAY EXAM OF PELVIS: CPT | Mod: S$GLB,,, | Performed by: NURSE PRACTITIONER

## 2018-11-20 PROCEDURE — 99203 OFFICE O/P NEW LOW 30 MIN: CPT | Mod: 25,S$GLB,, | Performed by: NURSE PRACTITIONER

## 2018-11-20 RX ORDER — METOPROLOL SUCCINATE 25 MG/1
25 TABLET, EXTENDED RELEASE ORAL DAILY
COMMUNITY

## 2018-11-20 RX ORDER — HYDROCODONE BITARTRATE AND ACETAMINOPHEN 5; 325 MG/1; MG/1
1 TABLET ORAL EVERY 6 HOURS PRN
Qty: 20 TABLET | Refills: 0 | Status: SHIPPED | OUTPATIENT
Start: 2018-11-20 | End: 2019-05-11

## 2018-11-20 RX ORDER — METHOCARBAMOL 750 MG/1
750 TABLET, FILM COATED ORAL 4 TIMES DAILY
Qty: 30 TABLET | Refills: 0 | Status: SHIPPED | OUTPATIENT
Start: 2018-11-20 | End: 2018-11-23

## 2018-11-20 RX ORDER — LANOLIN ALCOHOL/MO/W.PET/CERES
200 CREAM (GRAM) TOPICAL DAILY
COMMUNITY

## 2018-11-20 NOTE — PATIENT INSTRUCTIONS
Hip Strain    You have a strain of the muscles around the hip joint. A muscle strain is a stretching or tearing of muscle fibers. This causes pain, especially when you move that muscle. There may also be some swelling and bruising.  Home care  · Stay off the injured leg as much as possible until you can walk on it without pain. If you have a lot of pain with walking, crutches or a walker may be prescribed. These can be rented or purchased at many pharmacies and surgical or orthopedic supply stores. Follow your healthcare provider's advice regarding when to begin putting weight on that leg.  · Apply an ice pack over the injured area for 15 to 20 minutes every 3 to 6 hours. You should do this for the first 24 to 48 hours. You can make an ice pack by filling a plastic bag that seals at the top with ice cubes and then wrapping it with a thin towel. Be careful not to injure your skin with the ice treatments. Ice should never be applied directly to skin. Continue the use of ice packs for relief of pain and swelling as needed. After 48 hours, apply heat (warm shower or warm bath) for 15 to 20 minutes several times a day, or alternate ice and heat.  · You may use over-the-counter pain medicine to control pain, unless another pain medicine was prescribed. If you have chronic liver or kidney disease or ever had a stomach ulcer or GI bleeding, talk with your healthcare provider beforeusing these medicines.  · If you play sports, you may resume these activities when you are able to hop and run on the injured leg without pain.  Follow-up care  Follow up with your healthcare provider, or as advised. If your symptoms do not begin to get better after a week, more tests may be needed.  If X-rays were taken, you will be told of any new findings that may affect your care.  When to seek medical advice  Call your healthcare provider right away if any of these occur:  · Increased swelling or bruising  · Increased pain  · Losing the  ability to put weight on the injured side  Date Last Reviewed: 11/19/2015  © 3978-8343 [employer]. [address] [place] [address], [place] [address]. All rights reserved. This information is not intended as a substitute for professional medical care. Always follow your healthcare professional's instructions.

## 2018-11-20 NOTE — PROGRESS NOTES
"Subjective:       Patient ID: Nicho Duque is a 67 y.o. male.    Vitals:  height is 5' 11" (1.803 m) and weight is 76.6 kg (168 lb 12.8 oz). His temperature is 98.7 °F (37.1 °C). His blood pressure is 104/68 and his pulse is 76. His respiration is 14 and oxygen saturation is 97%.     Chief Complaint: Fall    PT states fell 4-5 days ago and landed on stairs. Injured left buttocks/hip with fall. Pt is ambulatory but states pain with ambulation and pain worsens with prolonged standing or sitting and also pain with bending over.       Fall   The accident occurred 3 to 5 days ago. He landed on hard floor. The point of impact was the buttocks and left hip. The pain is present in the left hip. The pain is at a severity of 8/10. The pain is moderate. The symptoms are aggravated by ambulation, movement, pressure on injury, standing, sitting and rotation. Pertinent negatives include no abdominal pain, hematuria or loss of consciousness. He has tried nothing for the symptoms.       Constitution: Negative for fatigue.   HENT: Negative for facial swelling and facial trauma.    Neck: Negative for neck stiffness.   Cardiovascular: Negative for chest trauma.   Eyes: Negative for eye trauma, double vision and blurred vision.   Gastrointestinal: Negative for abdominal trauma, abdominal pain and rectal bleeding.   Genitourinary: Negative for hematuria, genital trauma and pelvic pain.   Musculoskeletal: Positive for pain, pain with walking and muscle ache. Negative for trauma, joint swelling and abnormal ROM of joint.   Skin: Positive for bruising. Negative for color change, wound, abrasion and laceration.   Neurological: Negative for dizziness, history of vertigo, light-headedness, coordination disturbances, altered mental status and loss of consciousness.   Hematologic/Lymphatic: Negative for history of bleeding disorder.   Psychiatric/Behavioral: Negative for altered mental status.       Objective:      Physical Exam "   Constitutional: He is oriented to person, place, and time. He appears well-developed and well-nourished.   HENT:   Head: Normocephalic and atraumatic.   Eyes: Conjunctivae and EOM are normal. Pupils are equal, round, and reactive to light.   Neck: Normal range of motion. Neck supple.   Cardiovascular: Normal rate, regular rhythm and normal heart sounds.   Pulmonary/Chest: Effort normal and breath sounds normal.   Abdominal: Soft. Bowel sounds are normal.   Musculoskeletal: He exhibits tenderness.        Left hip: He exhibits tenderness.   Large ecchymosis to left buttocks, pain with palpation to left gluteal muscle. FROM to left hip with pain.    Neurological: He is alert and oriented to person, place, and time.   Skin: Skin is warm and dry.   Nursing note and vitals reviewed.      Assessment:       1. Left hip pain    2. Hematoma        Plan:         Left hip pain  -     X-Ray Hip 2 or 3 views Left; Future; Expected date: 11/20/2018    Hematoma    Other orders  -     HYDROcodone-acetaminophen (NORCO) 5-325 mg per tablet; Take 1 tablet by mouth every 6 (six) hours as needed.  Dispense: 20 tablet; Refill: 0  -     methocarbamol (ROBAXIN) 750 MG Tab; Take 1 tablet (750 mg total) by mouth 4 (four) times daily. for 3 days  Dispense: 30 tablet; Refill: 0    xray - no acute fracture - official read pending

## 2019-02-12 DIAGNOSIS — I48.0 PAROXYSMAL ATRIAL FIBRILLATION: ICD-10-CM

## 2019-02-12 RX ORDER — ESCITALOPRAM OXALATE 20 MG/1
TABLET ORAL
Qty: 90 TABLET | Refills: 0 | Status: SHIPPED | OUTPATIENT
Start: 2019-02-12

## 2019-02-12 RX ORDER — RIVAROXABAN 20 MG/1
TABLET, FILM COATED ORAL
Qty: 30 TABLET | Refills: 0 | Status: SHIPPED | OUTPATIENT
Start: 2019-02-12 | End: 2019-03-12 | Stop reason: SDUPTHER

## 2019-03-12 DIAGNOSIS — I48.0 PAROXYSMAL ATRIAL FIBRILLATION: ICD-10-CM

## 2019-03-12 RX ORDER — RIVAROXABAN 20 MG/1
TABLET, FILM COATED ORAL
Qty: 30 TABLET | Refills: 0 | Status: SHIPPED | OUTPATIENT
Start: 2019-03-12 | End: 2019-04-07 | Stop reason: SDUPTHER

## 2019-04-03 ENCOUNTER — TELEPHONE (OUTPATIENT)
Dept: FAMILY MEDICINE | Facility: CLINIC | Age: 69
End: 2019-04-03

## 2019-04-03 NOTE — TELEPHONE ENCOUNTER
----- Message from Nakita Cortez sent at 4/3/2019  1:12 PM CDT -----  Type: Needs Medical Advice    Who Called:  Patient   Best Call Back Number: [phone]  Additional Information: Patient has to have dental work on 04/17/19 dentist is requesting information on when and how long patient needs to be off of Xarelto, please fax to Dr. Darius Aguilar at [phone]      Thank you

## 2019-04-03 NOTE — TELEPHONE ENCOUNTER
He has atrial fibrillation and should not not be more than 5 days off Xarelto. Medical clear for dental surgery

## 2019-04-07 DIAGNOSIS — I48.0 PAROXYSMAL ATRIAL FIBRILLATION: ICD-10-CM

## 2019-04-08 RX ORDER — RIVAROXABAN 20 MG/1
TABLET, FILM COATED ORAL
Qty: 30 TABLET | Refills: 0 | Status: SHIPPED | OUTPATIENT
Start: 2019-04-08 | End: 2019-05-03 | Stop reason: SDUPTHER

## 2019-05-03 DIAGNOSIS — I48.0 PAROXYSMAL ATRIAL FIBRILLATION: ICD-10-CM

## 2019-05-03 RX ORDER — RIVAROXABAN 20 MG/1
TABLET, FILM COATED ORAL
Qty: 30 TABLET | Refills: 0 | Status: SHIPPED | OUTPATIENT
Start: 2019-05-03

## 2019-05-11 ENCOUNTER — OFFICE VISIT (OUTPATIENT)
Dept: URGENT CARE | Facility: CLINIC | Age: 69
End: 2019-05-11
Payer: MEDICARE

## 2019-05-11 VITALS
DIASTOLIC BLOOD PRESSURE: 69 MMHG | HEIGHT: 71 IN | TEMPERATURE: 98 F | BODY MASS INDEX: 23.35 KG/M2 | RESPIRATION RATE: 16 BRPM | HEART RATE: 75 BPM | WEIGHT: 166.81 LBS | SYSTOLIC BLOOD PRESSURE: 111 MMHG

## 2019-05-11 DIAGNOSIS — M25.562 ACUTE PAIN OF LEFT KNEE: Primary | ICD-10-CM

## 2019-05-11 DIAGNOSIS — S80.02XA CONTUSION OF LEFT KNEE, INITIAL ENCOUNTER: ICD-10-CM

## 2019-05-11 DIAGNOSIS — S80.212A ABRASION OF LEFT KNEE, INITIAL ENCOUNTER: ICD-10-CM

## 2019-05-11 PROCEDURE — 90715 TDAP VACCINE 7 YRS/> IM: CPT | Mod: AT,S$GLB,, | Performed by: NURSE PRACTITIONER

## 2019-05-11 PROCEDURE — 99214 PR OFFICE/OUTPT VISIT, EST, LEVL IV, 30-39 MIN: ICD-10-PCS | Mod: 25,S$GLB,, | Performed by: NURSE PRACTITIONER

## 2019-05-11 PROCEDURE — 90471 PR IMMUNIZ ADMIN,1 SINGLE/COMB VAC/TOXOID: ICD-10-PCS | Mod: S$GLB,,, | Performed by: NURSE PRACTITIONER

## 2019-05-11 PROCEDURE — 99070 SPECIAL SUPPLIES PHYS/QHP: CPT | Mod: S$GLB,,, | Performed by: NURSE PRACTITIONER

## 2019-05-11 PROCEDURE — 99070 PR SPECIAL SUPPLIES: ICD-10-PCS | Mod: S$GLB,,, | Performed by: NURSE PRACTITIONER

## 2019-05-11 PROCEDURE — 99214 OFFICE O/P EST MOD 30 MIN: CPT | Mod: 25,S$GLB,, | Performed by: NURSE PRACTITIONER

## 2019-05-11 PROCEDURE — 90715 TDAP VACCINE GREATER THAN OR EQUAL TO 7YO IM: ICD-10-PCS | Mod: AT,S$GLB,, | Performed by: NURSE PRACTITIONER

## 2019-05-11 PROCEDURE — 73564 X-RAY EXAM KNEE 4 OR MORE: CPT | Mod: LT,S$GLB,, | Performed by: NURSE PRACTITIONER

## 2019-05-11 PROCEDURE — 73564 PR  X-RAY KNEE 4+ VIEW: ICD-10-PCS | Mod: LT,S$GLB,, | Performed by: NURSE PRACTITIONER

## 2019-05-11 PROCEDURE — 90471 IMMUNIZATION ADMIN: CPT | Mod: S$GLB,,, | Performed by: NURSE PRACTITIONER

## 2019-05-11 RX ORDER — MUPIROCIN 20 MG/G
OINTMENT TOPICAL
Qty: 22 G | Refills: 0 | Status: SHIPPED | OUTPATIENT
Start: 2019-05-11

## 2019-05-11 NOTE — PATIENT INSTRUCTIONS
Knee Pain  Knee pain is very common. Its especially common in active people who put a lot of pressure on their knees, like runners. It affects women more often than men.  Your kneecap (patella) is a thick, round bone. It covers and protects the front portion of your knee joint. It moves along a groove in your thighbone (femur) as part of the patellofemoral joint. A layer of cartilage surrounds the underside of your kneecap. This layer protects it from grinding against your femur.  When this cartilage softens and breaks down, it can cause knee pain. This is partly because of repetitive stress. The stress irritates the lining of the joint. This causes pain in the underlying bone.  What causes knee pain?  Many things can cause knee pain. You may have more than one cause. Some of these include:  · Overuse of the knee joint  · The kneecap doesnt line up with the tissue around it  · Damage to small nerves in the area  · Damage to the ligament-like structure that holds the kneecap in place (retinaculum)  · Breakdown of the bone under the cartilage  · Swelling in the soft tissues around the kneecap  · Injury  You might be more likely to have knee pain if you:  · Exercise a lot  · Recently increased the intensity of your workouts  · Have a body mass index (BMI) greater than 25  · Have poor alignment of your kneecap  · Walk with your feet turned overly outward or inward  · Have weakness in surrounding muscle groups (inner quad or hip adductor muscles)  · Have too much tightness in surrounding muscle groups (hamstrings or iliotibial band)  · Have a recent history of injury to the area  · Are female  Symptoms of knee pain  This type of knee pain is a dull, aching pain in the front of the knee in the area under and around the kneecap. This pain may start quickly or slowly. Your pain might be worse when you squat, run, or sit for a long time. You might also sometimes feel like your knee is giving out. You may have symptoms in  one or both of your knees.  Diagnosing knee pain  Your healthcare provider will ask about your medical history and your symptoms. Be sure to describe any activities that make your knee pain worse. He or she will look at your knee. This will include tests of your range of motion, strength, and areas of pain of your knee. Your knee alignment will be checked.  Your healthcare provider will need to rule out other causes of your knee pain, such as arthritis. You may need an imaging test, such as an X-ray or MRI.  Treatment for knee pain  Treatments that can help ease your symptoms may include:  · Avoiding activities for a while that make your pain worse, returning to activity over time  · Icing the outside of your knee when it causes you pain  · Taking over-the-counter pain medicine  · Wearing a knee brace or taping your knee to support it  · Wearing special shoe inserts to help keep your feet in the proper alignment  · Doing special exercises to stretch and strengthen the muscles around your hip and your knee  These steps help most people manage knee pain. But some cases of knee pain need to be treated with surgery. You may need surgery right away. Or you may need it later if other treatments dont work. Your healthcare provider may refer you to an orthopedic surgeon. He or she will talk with you about your choices.  Preventing knee pain  Losing weight and correcting excess muscle tightness or muscle weakness may help lower your risk.  In some cases, you can prevent knee pain. To help prevent a flare-up of knee pain, you do these things:  · Regularly do all the exercises your doctor or physical therapist advises  · Support your knee as advised by your doctor or physical therapist  · Increase training gradually, and ease up on training when needed  · Have an expert check your gait for running or other sporting activities  · Stretch properly before and after exercise  · Replace your running shoes regularly  · Lose excess  weight     When to call your healthcare provider  Call your healthcare provider right away if:  · Your symptoms dont get better after a few weeks of treatment  · You have any new symptoms   Date Last Reviewed: 4/1/2017 © 2000-2017 [employer] [address] [place] [address], [place] [address]. All rights reserved. This information is not intended as a substitute for professional medical care. Always follow your healthcare professional's instructions.        R.I.C.E.    R.I.C.E. stands for Rest, Ice, Compression, and Elevation. Doing these things helps limit pain and swelling after an injury. R.I.C.E. also helps injuries heal faster. Use R.I.C.E. for sprains, strains, and severe bruises or bumps. Follow the tips on this handout and begin R.I.C.E. as soon as possible after an injury.  ? Rest  Pain is your bodys way of telling you to rest an injured area. Whether you have hurt an elbow, hand, foot, or knee, limiting its use will prevent further injury and help you heal.  ? Ice  Applying ice right after an injury helps prevent swelling and reduce pain. Dont place ice directly on your skin.  · Wrap a cold pack or bag of ice in a thin cloth. Place it over the injured area.  · Ice for 10 minutes every 3 hours. Dont ice for more than 20 minutes at a time.  ? Compression  Putting pressure (compression) on an injury helps prevent swelling and provides support.  · Wrap the injured area firmly with an elastic bandage. If your hand or foot tingles, becomes discolored, or feels cold to the touch, the bandage may be too tight. Rewrap it more loosely.  · If your bandage becomes too loose, rewrap it.  · Do not wear an elastic bandage overnight.  ? Elevation  Keeping an injury elevated helps reduce swelling, pain, and throbbing. Elevation is most effective when the injury is kept elevated higher than the heart.     Call your healthcare provider if you notice any of the following:  · Fingers or toes feel numb, are cold to  the touch, or change color  · Skin looks shiny or tight  · Pain, swelling, or bruising worsens and is not improved with elevation   Date Last Reviewed: 9/3/2015  © 0905-6240 [employer]. [address] [place] [address], [place] [address]. All rights reserved. This information is not intended as a substitute for professional medical care. Always follow your healthcare professional's instructions.        Bruises (Contusions)    A contusion is a bruise. A bruise happens when a blow to your body doesn't break the skin but does break blood vessels beneath the skin. Blood leaking from the broken vessels causes redness and swelling. As it heals, your bruise is likely to turn colors like purple, green, and yellow. This is normal. The bruise should fade in 2 or 3 weeks.  Factors that make you more likely to bruise  Almost everyone bruises now and then. Certain people do bruise more easily than others. You're more prone to bruising as you get older. That's because blood vessels become more fragile with age. You're also more likely to bruise if you have a clotting disorder such as hemophilia or take medications that reduce clotting, including aspirin, coumadin, newer agents.  When to go to the emergency room (ER)  Bruises almost always heal on their own without special treatment. But for some people, a bad bruise can be serious. Seek medical care if you:  · Have a clotting disorder such as hemophilia.  · Have cirrhosis or other serious liver disease.  · Take blood-thinning medications such as warfarin (Coumadin).  What to expect in the ER  A doctor will examine your bruise and ask about any health conditions you have. In some cases, you may have a test to check how well your blood clots. Other treatment will depend on your needs.  Follow-up care  Sometimes a bruise gets worse instead of better. It may become larger and more swollen. This can occur when your body walls off a small pool of blood under the skin (hematoma).  In very rare cases, your doctor may need to drain excess blood from the area.  Tip:  Apply an ice pack or bag of frozen peas to a bruise (keep a thin cloth between the cold source and your skin). This can help reduce redness and swelling.   Date Last Reviewed: 12/1/2016  © 7748-0554 [employer]. [address] [place] [address], [place] [address]. All rights reserved. This information is not intended as a substitute for professional medical care. Always follow your healthcare professional's instructions.        Abrasions  Abrasions are skin scrapes. Their treatment depends on how large and deep the abrasion is.  Home care  You may be prescribed an antibiotic cream or ointment to apply to the wound. This helps prevent infection. Follow instructions when using this medicine.  General care  · To care for the abrasion, do the following each day for as long as directed by your healthcare provider.  ¨ If you were given a bandage, change it once a day. If your bandage sticks to the wound, soak it in warm water until it loosens.  ¨ Wash the area with soap and warm water. You may do this in a sink or under a tub faucet or shower. Rinse off the soap. Then pat the area dry with a clean towel.  ¨ If antibiotic ointment or cream was prescribed, reapply it to the wound as directed. Cover the wound with a fresh nonstick bandage. If the bandage becomes wet or dirty, change it as soon as possible.  ¨ Some antibiotic ointments or cream can cause an allergic reaction or dermatitis. This may cause redness, itching and or hives. If this occurs, stop using the ointment immediately and wash off any remaining ointment. You may need to take some allergy medicine to relieve symptoms.  · You may use acetaminophen or ibuprofen to control pain unless another pain medicine was prescribed. Talk with your healthcare provider before using these medicines if you have chronic liver or kidney disease or ever had a stomach ulcer or GI bleeding.  Dont use ibuprofen in children younger than six months old.  · Most skin wounds heal within 10 days. But an infection may occur even with treatment. So its important to watch the wound for signs of infection as listed below.  Follow-up care  Follow up with your healthcare provider, or as advised.  When to seek medical advice  Call your healthcare provider right away if any of these occur:  · Fever of 100.4ºF (38ºC) or higher, or as directed by your healthcare provider  · Increasing pain, redness, swelling, or drainage from the wound  · Bleeding from the wound that does not stop after a few minutes of steady, firm pressure  · Decreased ability to move any body part near the wound  Date Last Reviewed: 3/3/2017  © 9176-8129 [employer]. [address] [place] [address], [place] [address]. All rights reserved. This information is not intended as a substitute for professional medical care. Always follow your healthcare professional's instructions.
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
## 2019-05-15 RX ORDER — ESCITALOPRAM OXALATE 20 MG/1
TABLET ORAL
Qty: 90 TABLET | Refills: 0 | OUTPATIENT
Start: 2019-05-15